# Patient Record
Sex: FEMALE | Race: WHITE | NOT HISPANIC OR LATINO | URBAN - METROPOLITAN AREA
[De-identification: names, ages, dates, MRNs, and addresses within clinical notes are randomized per-mention and may not be internally consistent; named-entity substitution may affect disease eponyms.]

---

## 2024-08-14 ENCOUNTER — OFFICE VISIT (OUTPATIENT)
Dept: PODIATRY | Facility: CLINIC | Age: 69
End: 2024-08-14
Payer: COMMERCIAL

## 2024-08-14 DIAGNOSIS — R26.89 ANTALGIC GAIT: ICD-10-CM

## 2024-08-14 DIAGNOSIS — M79.672 FOOT PAIN, BILATERAL: Primary | ICD-10-CM

## 2024-08-14 DIAGNOSIS — I87.8 CHRONIC VENOUS STASIS: ICD-10-CM

## 2024-08-14 DIAGNOSIS — M79.671 FOOT PAIN, BILATERAL: Primary | ICD-10-CM

## 2024-08-14 DIAGNOSIS — M72.2 PLANTAR FASCIITIS OF LEFT FOOT: ICD-10-CM

## 2024-08-14 DIAGNOSIS — E66.01 MORBID OBESITY (MULTI): ICD-10-CM

## 2024-08-14 DIAGNOSIS — B35.1 ONYCHOMYCOSIS: ICD-10-CM

## 2024-08-14 PROCEDURE — 1036F TOBACCO NON-USER: CPT | Performed by: PODIATRIST

## 2024-08-14 PROCEDURE — 20605 DRAIN/INJ JOINT/BURSA W/O US: CPT | Performed by: PODIATRIST

## 2024-08-14 PROCEDURE — 1160F RVW MEDS BY RX/DR IN RCRD: CPT | Performed by: PODIATRIST

## 2024-08-14 PROCEDURE — 1159F MED LIST DOCD IN RCRD: CPT | Performed by: PODIATRIST

## 2024-08-14 PROCEDURE — 99214 OFFICE O/P EST MOD 30 MIN: CPT | Performed by: PODIATRIST

## 2024-08-14 RX ORDER — DESIPRAMINE HYDROCHLORIDE 10 MG/1
1 TABLET ORAL DAILY
COMMUNITY
Start: 2019-05-08

## 2024-08-14 RX ORDER — MOMETASONE FUROATE 220 UG/1
INHALANT RESPIRATORY (INHALATION)
COMMUNITY

## 2024-08-14 RX ORDER — KETOCONAZOLE 20 MG/G
CREAM TOPICAL
COMMUNITY
Start: 2024-04-05

## 2024-08-14 RX ORDER — MOMETASONE FUROATE MONOHYDRATE 50 UG/1
2 SPRAY, METERED NASAL DAILY
COMMUNITY

## 2024-08-14 RX ORDER — POTASSIUM CHLORIDE 750 MG/1
20 TABLET, EXTENDED RELEASE ORAL DAILY
COMMUNITY

## 2024-08-14 RX ORDER — FUROSEMIDE 40 MG/1
40 TABLET ORAL 2 TIMES DAILY
COMMUNITY

## 2024-08-14 RX ORDER — DIAZEPAM 2 MG/1
2 TABLET ORAL NIGHTLY PRN
COMMUNITY
Start: 2024-05-01

## 2024-08-14 RX ORDER — NYSTATIN 100000 [USP'U]/G
1 POWDER TOPICAL 3 TIMES DAILY
COMMUNITY
Start: 2023-09-13

## 2024-08-14 RX ORDER — PREDNISONE 20 MG/1
TABLET ORAL
COMMUNITY

## 2024-08-14 RX ORDER — MONTELUKAST SODIUM 10 MG/1
10 TABLET ORAL DAILY
COMMUNITY

## 2024-08-14 RX ORDER — METHOCARBAMOL 500 MG/1
TABLET, FILM COATED ORAL
COMMUNITY
Start: 2024-02-13

## 2024-08-14 RX ORDER — LEVALBUTEROL INHALATION SOLUTION 0.63 MG/3ML
SOLUTION RESPIRATORY (INHALATION)
COMMUNITY

## 2024-08-14 RX ORDER — ALLOPURINOL 100 MG/1
TABLET ORAL
COMMUNITY

## 2024-08-14 RX ORDER — HYDROCORTISONE 25 MG/G
CREAM TOPICAL
COMMUNITY
Start: 2016-02-25

## 2024-08-14 RX ORDER — TRIAMCINOLONE ACETONIDE 40 MG/ML
40 INJECTION, SUSPENSION INTRA-ARTICULAR; INTRAMUSCULAR ONCE
Status: COMPLETED | OUTPATIENT
Start: 2024-08-14 | End: 2024-08-14

## 2024-08-14 RX ORDER — MUPIROCIN 20 MG/G
OINTMENT TOPICAL
COMMUNITY
Start: 2023-11-13

## 2024-08-14 RX ORDER — FLECAINIDE ACETATE 100 MG/1
100 TABLET ORAL EVERY 12 HOURS
COMMUNITY

## 2024-08-14 NOTE — PROGRESS NOTES
Chief Complaint   Patient presents with    Foot Pain     Patient is here today for left foot pain and a wound right leg      Last seen 4/23/2023    Pain with the left foot.  Points to the plantar bottom aspect of the foot.  Feels like there is a sock rolled up in the foot.  This is near the heel.  Duration 5 months or so. Also today in the office she noticed weeping from the right lower leg.  Has had blisters in the recent past.  Swelling of the extremities.  Not using compression at all.  She does have a supportive stocking for the left knee.  Has knee problems.  Using a cane for ambulation.  Also complains of very painful thickened nails.  Difficulty cutting.  End up cutting herself when she tried to cut the nails.         General/Constitutional: Alert. NAD.  Verbally obese.  Respiratory: Non labored breathing.   Psychiatric: Mood and affect normal/baseline.   HEENT: Sclera clear. Wearing corrective lenses.  Dermatologic: Nails are hypertrophic crumbly and yellow.  Nails are quite elongated.  Painful to palpation. No acute inflammatory infectious process. Web spaces are dry. No ulcers no pre-ulcerative areas except right lower leg there is several small blisters that are freely weeping serous fluid.  No signs of infection.  Vascular: Pedal pulses are intact and symmetric including the dorsalis pedis and the posterior tibial pulses.  Chronic edema of the extremities.  CVI.  Chronic lymphedema.    Neurological: Alert and oriented. No acute distress. No sensory impairment bilateral.  Musculoskeletal: Strength is normal for age. No acute deficits appreciated.  Bilateral pes planus deformity.  Pain on palpation plantar aspect left heel at medial calcaneal tuberosity.  No Achilles pain either extremity..     Impression: Chronic venous insufficiency.  Chronic lymphedema.  Bilateral foot pain.  Painful nail pathology.  Onychomycosis.  Plantar fasciitis.  Pes planus.  Morbid obesity       -Today's treatment and course of  therapy was discussed with the patient in detail. Patient's questions were answered. Proper foot care was discussed. This dictation was done using Dragon computer software and as such may contain grammatical errors.     -Nail debridement multiple nails 1 through 5 bilaterally.  Proper nail care discussed    -Order venous reflux study.    -Compression or lymphedema pumps would be helpful.    -Discussed proper shoe gear.  Weight loss would be helpful.  She understands this no knee surgery until weight loss.    -Any areas that are weeping should be covered with dry sterile dressing topical antibiotic.

## 2024-08-27 DIAGNOSIS — M79.671 PAIN IN BOTH FEET: Primary | ICD-10-CM

## 2024-08-27 DIAGNOSIS — M79.672 PAIN IN BOTH FEET: Primary | ICD-10-CM

## 2024-08-27 RX ORDER — TRAMADOL HYDROCHLORIDE 50 MG/1
50 TABLET ORAL EVERY 8 HOURS PRN
Qty: 10 TABLET | Refills: 1 | Status: SHIPPED | OUTPATIENT
Start: 2024-08-27 | End: 2024-09-06

## 2024-09-25 ENCOUNTER — OFFICE VISIT (OUTPATIENT)
Dept: PODIATRY | Facility: CLINIC | Age: 69
End: 2024-09-25
Payer: COMMERCIAL

## 2024-09-25 DIAGNOSIS — R26.89 ANTALGIC GAIT: ICD-10-CM

## 2024-09-25 DIAGNOSIS — M79.672 FOOT PAIN, BILATERAL: Primary | ICD-10-CM

## 2024-09-25 DIAGNOSIS — M79.671 FOOT PAIN, BILATERAL: Primary | ICD-10-CM

## 2024-09-25 DIAGNOSIS — B35.1 ONYCHOMYCOSIS: ICD-10-CM

## 2024-09-25 DIAGNOSIS — M72.2 PLANTAR FASCIITIS OF LEFT FOOT: ICD-10-CM

## 2024-09-25 DIAGNOSIS — I87.8 CHRONIC VENOUS STASIS: ICD-10-CM

## 2024-09-25 DIAGNOSIS — E66.01 MORBID OBESITY (MULTI): ICD-10-CM

## 2024-09-25 PROCEDURE — 99214 OFFICE O/P EST MOD 30 MIN: CPT | Performed by: PODIATRIST

## 2024-09-25 PROCEDURE — 1036F TOBACCO NON-USER: CPT | Performed by: PODIATRIST

## 2024-09-25 PROCEDURE — 1159F MED LIST DOCD IN RCRD: CPT | Performed by: PODIATRIST

## 2024-09-25 PROCEDURE — 1160F RVW MEDS BY RX/DR IN RCRD: CPT | Performed by: PODIATRIST

## 2024-09-25 RX ORDER — CICLOPIROX 80 MG/ML
SOLUTION TOPICAL
Qty: 6.6 ML | Refills: 1 | Status: SHIPPED | OUTPATIENT
Start: 2024-09-25

## 2024-09-25 NOTE — PROGRESS NOTES
Chief Complaint   Patient presents with    Foot Pain     Patient is here today for a follow up ELLIS foot pain        Has episodes of sciatica. Severe pain. Went to ER.  Physical therapy for this.  It has helped.   Wearing compression.   Has inserts from [employer].  Using a cane for ambulation.   Ongoing knee pain.   Also complains of very painful thickened nails.  Difficulty cutting.  End up cutting herself when she tried to cut the nails.  Has slowly lost some weight.   Will be seeing vascular doctor in the few weeks.  Has not had endovascular done.     General/Constitutional: Alert. NAD.  Morbid obese.  Respiratory: Non labored breathing.   Psychiatric: Mood and affect normal/baseline.   HEENT: Sclera clear. Wearing corrective lenses.  Dermatologic: Nails are hypertrophic crumbly and yellow.  Nails are quite elongated.  Painful to palpation. No acute inflammatory infectious process.   Blistering with the legs.  Slight maceration webspaces.    Vascular: Pedal pulses are intact and symmetric including the dorsalis pedis and the posterior tibial pulses.  Chronic edema of the extremities.  CVI.  Chronic lymphedema.  Swelling is slightly decreased in the legs.  Legs are not is indurated.  No evidence of cellulitis either extremity.  Neurological: Alert and oriented. No acute distress. No sensory impairment bilateral.  Musculoskeletal: Strength is normal for age. No acute deficits appreciated.  Bilateral pes planus deformity.  Today no palpable pain with the left foot.  No acute swelling.  No Tinel's.  No tissue induration noted.      Impression: Chronic venous insufficiency.  Chronic lymphedema.  Bilateral foot pain.  Painful nail pathology.  Onychomycosis.  Improved plantar fasciitis.  Pes planus.  Morbid obesity       -Today's treatment and course of therapy was discussed with the patient in detail. Patient's questions were answered. Proper foot care was discussed. This dictation was done using Dragon computer  software and as such may contain grammatical errors.     -Nail debridement multiple nails 1 through 5 bilaterally.  Proper nail care discussed.  Keep clean and dry between the digits.    -When you get the orthotics a slow break-in period encouraged.  Avoid walking barefoot.  Your shoes should probably be changed every 6 months or so especially if you start to feel discomfort and collapse of the arch of the shoe.    -Follow-up with endovascular.    -Rx Penlac.    -Continue with compression.  Elevate the legs as often you can.  Limit your salt intake.

## 2025-04-13 ENCOUNTER — HOSPITAL ENCOUNTER (OUTPATIENT)
Facility: HOSPITAL | Age: 70
Setting detail: OBSERVATION
End: 2025-04-13
Attending: STUDENT IN AN ORGANIZED HEALTH CARE EDUCATION/TRAINING PROGRAM | Admitting: STUDENT IN AN ORGANIZED HEALTH CARE EDUCATION/TRAINING PROGRAM
Payer: COMMERCIAL

## 2025-04-13 ENCOUNTER — APPOINTMENT (OUTPATIENT)
Dept: RADIOLOGY | Facility: HOSPITAL | Age: 70
End: 2025-04-13
Payer: COMMERCIAL

## 2025-04-13 VITALS
SYSTOLIC BLOOD PRESSURE: 144 MMHG | HEART RATE: 81 BPM | DIASTOLIC BLOOD PRESSURE: 72 MMHG | BODY MASS INDEX: 51.38 KG/M2 | HEIGHT: 63 IN | TEMPERATURE: 97.3 F | WEIGHT: 290 LBS | OXYGEN SATURATION: 95 % | RESPIRATION RATE: 16 BRPM

## 2025-04-13 DIAGNOSIS — R42 VERTIGO: ICD-10-CM

## 2025-04-13 DIAGNOSIS — E87.6 HYPOKALEMIA: ICD-10-CM

## 2025-04-13 DIAGNOSIS — I63.9 CEREBROVASCULAR ACCIDENT (CVA), UNSPECIFIED MECHANISM (MULTI): ICD-10-CM

## 2025-04-13 DIAGNOSIS — R42 DIZZINESS: Primary | ICD-10-CM

## 2025-04-13 LAB
ALBUMIN SERPL BCP-MCNC: 4 G/DL (ref 3.4–5)
ALP SERPL-CCNC: 72 U/L (ref 33–136)
ALT SERPL W P-5'-P-CCNC: 8 U/L (ref 7–45)
ANION GAP SERPL CALC-SCNC: 15 MMOL/L (ref 10–20)
AST SERPL W P-5'-P-CCNC: 15 U/L (ref 9–39)
BASOPHILS # BLD AUTO: 0.05 X10*3/UL (ref 0–0.1)
BASOPHILS NFR BLD AUTO: 0.7 %
BILIRUB SERPL-MCNC: 0.6 MG/DL (ref 0–1.2)
BNP SERPL-MCNC: 28 PG/ML (ref 0–99)
BUN SERPL-MCNC: 13 MG/DL (ref 6–23)
CALCIUM SERPL-MCNC: 8.5 MG/DL (ref 8.6–10.3)
CARDIAC TROPONIN I PNL SERPL HS: 3 NG/L (ref 0–13)
CARDIAC TROPONIN I PNL SERPL HS: 5 NG/L (ref 0–13)
CHLORIDE SERPL-SCNC: 105 MMOL/L (ref 98–107)
CO2 SERPL-SCNC: 25 MMOL/L (ref 21–32)
CREAT SERPL-MCNC: 0.9 MG/DL (ref 0.5–1.05)
EGFRCR SERPLBLD CKD-EPI 2021: 70 ML/MIN/1.73M*2
EOSINOPHIL # BLD AUTO: 0.32 X10*3/UL (ref 0–0.7)
EOSINOPHIL NFR BLD AUTO: 4.5 %
ERYTHROCYTE [DISTWIDTH] IN BLOOD BY AUTOMATED COUNT: 14.6 % (ref 11.5–14.5)
FLUAV RNA RESP QL NAA+PROBE: NOT DETECTED
FLUBV RNA RESP QL NAA+PROBE: NOT DETECTED
GLUCOSE SERPL-MCNC: 123 MG/DL (ref 74–99)
HCT VFR BLD AUTO: 42.1 % (ref 36–46)
HGB BLD-MCNC: 13 G/DL (ref 12–16)
IMM GRANULOCYTES # BLD AUTO: 0.03 X10*3/UL (ref 0–0.7)
IMM GRANULOCYTES NFR BLD AUTO: 0.4 % (ref 0–0.9)
LYMPHOCYTES # BLD AUTO: 1.52 X10*3/UL (ref 1.2–4.8)
LYMPHOCYTES NFR BLD AUTO: 21.5 %
MAGNESIUM SERPL-MCNC: 1.96 MG/DL (ref 1.6–2.4)
MCH RBC QN AUTO: 27.3 PG (ref 26–34)
MCHC RBC AUTO-ENTMCNC: 30.9 G/DL (ref 32–36)
MCV RBC AUTO: 88 FL (ref 80–100)
MONOCYTES # BLD AUTO: 0.51 X10*3/UL (ref 0.1–1)
MONOCYTES NFR BLD AUTO: 7.2 %
NEUTROPHILS # BLD AUTO: 4.64 X10*3/UL (ref 1.2–7.7)
NEUTROPHILS NFR BLD AUTO: 65.7 %
NRBC BLD-RTO: 0 /100 WBCS (ref 0–0)
PLATELET # BLD AUTO: 307 X10*3/UL (ref 150–450)
POTASSIUM SERPL-SCNC: 3.6 MMOL/L (ref 3.5–5.3)
PROT SERPL-MCNC: 6.8 G/DL (ref 6.4–8.2)
RBC # BLD AUTO: 4.77 X10*6/UL (ref 4–5.2)
RSV RNA RESP QL NAA+PROBE: NOT DETECTED
SARS-COV-2 RNA RESP QL NAA+PROBE: NOT DETECTED
SODIUM SERPL-SCNC: 141 MMOL/L (ref 136–145)
WBC # BLD AUTO: 7.1 X10*3/UL (ref 4.4–11.3)

## 2025-04-13 PROCEDURE — 36415 COLL VENOUS BLD VENIPUNCTURE: CPT | Performed by: STUDENT IN AN ORGANIZED HEALTH CARE EDUCATION/TRAINING PROGRAM

## 2025-04-13 PROCEDURE — 96372 THER/PROPH/DIAG INJ SC/IM: CPT

## 2025-04-13 PROCEDURE — 87637 SARSCOV2&INF A&B&RSV AMP PRB: CPT | Performed by: STUDENT IN AN ORGANIZED HEALTH CARE EDUCATION/TRAINING PROGRAM

## 2025-04-13 PROCEDURE — 94640 AIRWAY INHALATION TREATMENT: CPT

## 2025-04-13 PROCEDURE — 2500000002 HC RX 250 W HCPCS SELF ADMINISTERED DRUGS (ALT 637 FOR MEDICARE OP, ALT 636 FOR OP/ED): Performed by: STUDENT IN AN ORGANIZED HEALTH CARE EDUCATION/TRAINING PROGRAM

## 2025-04-13 PROCEDURE — 2500000001 HC RX 250 WO HCPCS SELF ADMINISTERED DRUGS (ALT 637 FOR MEDICARE OP)

## 2025-04-13 PROCEDURE — 83735 ASSAY OF MAGNESIUM: CPT | Performed by: STUDENT IN AN ORGANIZED HEALTH CARE EDUCATION/TRAINING PROGRAM

## 2025-04-13 PROCEDURE — 2500000004 HC RX 250 GENERAL PHARMACY W/ HCPCS (ALT 636 FOR OP/ED)

## 2025-04-13 PROCEDURE — 80053 COMPREHEN METABOLIC PANEL: CPT | Performed by: STUDENT IN AN ORGANIZED HEALTH CARE EDUCATION/TRAINING PROGRAM

## 2025-04-13 PROCEDURE — 2500000004 HC RX 250 GENERAL PHARMACY W/ HCPCS (ALT 636 FOR OP/ED): Performed by: STUDENT IN AN ORGANIZED HEALTH CARE EDUCATION/TRAINING PROGRAM

## 2025-04-13 PROCEDURE — 70496 CT ANGIOGRAPHY HEAD: CPT

## 2025-04-13 PROCEDURE — 99223 1ST HOSP IP/OBS HIGH 75: CPT | Performed by: STUDENT IN AN ORGANIZED HEALTH CARE EDUCATION/TRAINING PROGRAM

## 2025-04-13 PROCEDURE — 71046 X-RAY EXAM CHEST 2 VIEWS: CPT | Mod: FOREIGN READ | Performed by: RADIOLOGY

## 2025-04-13 PROCEDURE — 83880 ASSAY OF NATRIURETIC PEPTIDE: CPT | Performed by: STUDENT IN AN ORGANIZED HEALTH CARE EDUCATION/TRAINING PROGRAM

## 2025-04-13 PROCEDURE — 70450 CT HEAD/BRAIN W/O DYE: CPT | Performed by: RADIOLOGY

## 2025-04-13 PROCEDURE — 85025 COMPLETE CBC W/AUTO DIFF WBC: CPT | Performed by: STUDENT IN AN ORGANIZED HEALTH CARE EDUCATION/TRAINING PROGRAM

## 2025-04-13 PROCEDURE — 71046 X-RAY EXAM CHEST 2 VIEWS: CPT

## 2025-04-13 PROCEDURE — 2550000001 HC RX 255 CONTRASTS: Performed by: STUDENT IN AN ORGANIZED HEALTH CARE EDUCATION/TRAINING PROGRAM

## 2025-04-13 PROCEDURE — 70496 CT ANGIOGRAPHY HEAD: CPT | Performed by: RADIOLOGY

## 2025-04-13 PROCEDURE — 70450 CT HEAD/BRAIN W/O DYE: CPT

## 2025-04-13 PROCEDURE — G0378 HOSPITAL OBSERVATION PER HR: HCPCS

## 2025-04-13 PROCEDURE — 84484 ASSAY OF TROPONIN QUANT: CPT | Performed by: STUDENT IN AN ORGANIZED HEALTH CARE EDUCATION/TRAINING PROGRAM

## 2025-04-13 PROCEDURE — 99285 EMERGENCY DEPT VISIT HI MDM: CPT | Mod: 25 | Performed by: STUDENT IN AN ORGANIZED HEALTH CARE EDUCATION/TRAINING PROGRAM

## 2025-04-13 RX ORDER — IPRATROPIUM BROMIDE AND ALBUTEROL SULFATE 2.5; .5 MG/3ML; MG/3ML
3 SOLUTION RESPIRATORY (INHALATION) ONCE
Status: COMPLETED | OUTPATIENT
Start: 2025-04-13 | End: 2025-04-13

## 2025-04-13 RX ORDER — ACETAMINOPHEN 500 MG
500 TABLET ORAL NIGHTLY
COMMUNITY

## 2025-04-13 RX ORDER — ONDANSETRON HYDROCHLORIDE 2 MG/ML
4 INJECTION, SOLUTION INTRAVENOUS EVERY 6 HOURS PRN
Status: DISCONTINUED | OUTPATIENT
Start: 2025-04-13 | End: 2025-04-15 | Stop reason: HOSPADM

## 2025-04-13 RX ORDER — LEVALBUTEROL TARTRATE 45 UG/1
2 AEROSOL, METERED ORAL EVERY 4 HOURS PRN
COMMUNITY
Start: 2025-03-10

## 2025-04-13 RX ORDER — ACETAMINOPHEN 325 MG/1
500 TABLET ORAL NIGHTLY
Status: DISCONTINUED | OUTPATIENT
Start: 2025-04-13 | End: 2025-04-13

## 2025-04-13 RX ORDER — FUROSEMIDE 40 MG/1
40 TABLET ORAL DAILY
Status: DISCONTINUED | OUTPATIENT
Start: 2025-04-13 | End: 2025-04-15 | Stop reason: HOSPADM

## 2025-04-13 RX ORDER — ALBUTEROL SULFATE 0.83 MG/ML
1.25 SOLUTION RESPIRATORY (INHALATION) EVERY 4 HOURS PRN
Status: DISCONTINUED | OUTPATIENT
Start: 2025-04-13 | End: 2025-04-15

## 2025-04-13 RX ORDER — ATORVASTATIN CALCIUM 40 MG/1
40 TABLET, FILM COATED ORAL NIGHTLY
Status: DISCONTINUED | OUTPATIENT
Start: 2025-04-13 | End: 2025-04-14

## 2025-04-13 RX ORDER — SODIUM CHLORIDE, SODIUM LACTATE, POTASSIUM CHLORIDE, CALCIUM CHLORIDE 600; 310; 30; 20 MG/100ML; MG/100ML; MG/100ML; MG/100ML
100 INJECTION, SOLUTION INTRAVENOUS CONTINUOUS
Status: ACTIVE | OUTPATIENT
Start: 2025-04-13 | End: 2025-04-14

## 2025-04-13 RX ORDER — LABETALOL HYDROCHLORIDE 5 MG/ML
10 INJECTION, SOLUTION INTRAVENOUS EVERY 10 MIN PRN
Status: DISCONTINUED | OUTPATIENT
Start: 2025-04-13 | End: 2025-04-13

## 2025-04-13 RX ORDER — ACETAMINOPHEN 650 MG/1
650 SUPPOSITORY RECTAL EVERY 4 HOURS PRN
Status: DISCONTINUED | OUTPATIENT
Start: 2025-04-13 | End: 2025-04-15 | Stop reason: HOSPADM

## 2025-04-13 RX ORDER — METHOCARBAMOL 500 MG/1
500 TABLET, FILM COATED ORAL 3 TIMES DAILY PRN
Status: DISCONTINUED | OUTPATIENT
Start: 2025-04-13 | End: 2025-04-15 | Stop reason: HOSPADM

## 2025-04-13 RX ORDER — LEVALBUTEROL INHALATION SOLUTION 1.25 MG/3ML
1 SOLUTION RESPIRATORY (INHALATION) EVERY 4 HOURS PRN
COMMUNITY
Start: 2025-03-10

## 2025-04-13 RX ORDER — MECLIZINE HYDROCHLORIDE 25 MG/1
25 TABLET ORAL 3 TIMES DAILY PRN
Status: DISCONTINUED | OUTPATIENT
Start: 2025-04-13 | End: 2025-04-15 | Stop reason: HOSPADM

## 2025-04-13 RX ORDER — ASPIRIN 81 MG/1
81 TABLET ORAL DAILY
Status: DISCONTINUED | OUTPATIENT
Start: 2025-04-13 | End: 2025-04-15 | Stop reason: HOSPADM

## 2025-04-13 RX ORDER — BISMUTH SUBSALICYLATE 525 MG/30ML
15 LIQUID ORAL EVERY 6 HOURS PRN
COMMUNITY

## 2025-04-13 RX ORDER — FLECAINIDE ACETATE 100 MG/1
100 TABLET ORAL EVERY 12 HOURS
Status: DISCONTINUED | OUTPATIENT
Start: 2025-04-13 | End: 2025-04-15 | Stop reason: HOSPADM

## 2025-04-13 RX ORDER — BISMUTH SUBSALICYLATE 525 MG/30ML
262 LIQUID ORAL EVERY 6 HOURS PRN
Status: DISCONTINUED | OUTPATIENT
Start: 2025-04-13 | End: 2025-04-15 | Stop reason: HOSPADM

## 2025-04-13 RX ORDER — ACETAMINOPHEN 325 MG/1
650 TABLET ORAL EVERY 4 HOURS PRN
Status: DISCONTINUED | OUTPATIENT
Start: 2025-04-13 | End: 2025-04-15 | Stop reason: HOSPADM

## 2025-04-13 RX ORDER — ASCORBIC ACID 125 MG
1 TABLET,CHEWABLE ORAL DAILY
COMMUNITY

## 2025-04-13 RX ORDER — ACETAMINOPHEN 160 MG/5ML
650 SOLUTION ORAL EVERY 4 HOURS PRN
Status: DISCONTINUED | OUTPATIENT
Start: 2025-04-13 | End: 2025-04-15 | Stop reason: HOSPADM

## 2025-04-13 RX ORDER — MONTELUKAST SODIUM 10 MG/1
10 TABLET ORAL DAILY
Status: DISCONTINUED | OUTPATIENT
Start: 2025-04-13 | End: 2025-04-15 | Stop reason: HOSPADM

## 2025-04-13 RX ORDER — HYDROCORTISONE 25 MG/G
1 CREAM TOPICAL 2 TIMES DAILY PRN
Status: DISCONTINUED | OUTPATIENT
Start: 2025-04-13 | End: 2025-04-15 | Stop reason: HOSPADM

## 2025-04-13 RX ORDER — PREDNISONE 20 MG/1
40 TABLET ORAL DAILY
Status: DISCONTINUED | OUTPATIENT
Start: 2025-04-13 | End: 2025-04-13

## 2025-04-13 RX ORDER — ENOXAPARIN SODIUM 100 MG/ML
60 INJECTION SUBCUTANEOUS EVERY 12 HOURS SCHEDULED
Status: DISCONTINUED | OUTPATIENT
Start: 2025-04-13 | End: 2025-04-15 | Stop reason: HOSPADM

## 2025-04-13 RX ORDER — BISMUTH SUBSALICYLATE 525 MG/30ML
262 LIQUID ORAL EVERY 6 HOURS PRN
Status: DISCONTINUED | OUTPATIENT
Start: 2025-04-13 | End: 2025-04-13

## 2025-04-13 RX ADMIN — FLECAINIDE ACETATE 100 MG: 100 TABLET ORAL at 22:08

## 2025-04-13 RX ADMIN — ATORVASTATIN CALCIUM 40 MG: 40 TABLET, FILM COATED ORAL at 20:34

## 2025-04-13 RX ADMIN — SODIUM CHLORIDE, POTASSIUM CHLORIDE, SODIUM LACTATE AND CALCIUM CHLORIDE 100 ML/HR: 600; 310; 30; 20 INJECTION, SOLUTION INTRAVENOUS at 17:46

## 2025-04-13 RX ADMIN — IOHEXOL 75 ML: 350 INJECTION, SOLUTION INTRAVENOUS at 10:37

## 2025-04-13 RX ADMIN — BISMUTH SUBSALICYLATE 262 MG: 525 LIQUID ORAL at 22:08

## 2025-04-13 RX ADMIN — IPRATROPIUM BROMIDE AND ALBUTEROL SULFATE 3 ML: .5; 3 SOLUTION RESPIRATORY (INHALATION) at 08:13

## 2025-04-13 SDOH — SOCIAL STABILITY: SOCIAL INSECURITY: WITHIN THE LAST YEAR, HAVE YOU BEEN HUMILIATED OR EMOTIONALLY ABUSED IN OTHER WAYS BY YOUR PARTNER OR EX-PARTNER?: NO

## 2025-04-13 SDOH — SOCIAL STABILITY: SOCIAL INSECURITY: WITHIN THE LAST YEAR, HAVE YOU BEEN AFRAID OF YOUR PARTNER OR EX-PARTNER?: NO

## 2025-04-13 SDOH — HEALTH STABILITY: MENTAL HEALTH: HOW OFTEN DO YOU HAVE SIX OR MORE DRINKS ON ONE OCCASION?: NEVER

## 2025-04-13 SDOH — SOCIAL STABILITY: SOCIAL INSECURITY: ARE YOU OR HAVE YOU BEEN THREATENED OR ABUSED PHYSICALLY, EMOTIONALLY, OR SEXUALLY BY ANYONE?: NO

## 2025-04-13 SDOH — ECONOMIC STABILITY: INCOME INSECURITY: IN THE PAST 12 MONTHS HAS THE ELECTRIC, GAS, OIL, OR WATER COMPANY THREATENED TO SHUT OFF SERVICES IN YOUR HOME?: NO

## 2025-04-13 SDOH — SOCIAL STABILITY: SOCIAL INSECURITY: HAS ANYONE EVER THREATENED TO HURT YOUR FAMILY OR YOUR PETS?: NO

## 2025-04-13 SDOH — SOCIAL STABILITY: SOCIAL INSECURITY: DO YOU FEEL ANYONE HAS EXPLOITED OR TAKEN ADVANTAGE OF YOU FINANCIALLY OR OF YOUR PERSONAL PROPERTY?: NO

## 2025-04-13 SDOH — HEALTH STABILITY: MENTAL HEALTH: HOW MANY DRINKS CONTAINING ALCOHOL DO YOU HAVE ON A TYPICAL DAY WHEN YOU ARE DRINKING?: PATIENT DOES NOT DRINK

## 2025-04-13 SDOH — ECONOMIC STABILITY: FOOD INSECURITY: HOW HARD IS IT FOR YOU TO PAY FOR THE VERY BASICS LIKE FOOD, HOUSING, MEDICAL CARE, AND HEATING?: NOT VERY HARD

## 2025-04-13 SDOH — ECONOMIC STABILITY: HOUSING INSECURITY: IN THE LAST 12 MONTHS, WAS THERE A TIME WHEN YOU WERE NOT ABLE TO PAY THE MORTGAGE OR RENT ON TIME?: NO

## 2025-04-13 SDOH — SOCIAL STABILITY: SOCIAL INSECURITY: ABUSE: ADULT

## 2025-04-13 SDOH — SOCIAL STABILITY: SOCIAL INSECURITY
WITHIN THE LAST YEAR, HAVE YOU BEEN RAPED OR FORCED TO HAVE ANY KIND OF SEXUAL ACTIVITY BY YOUR PARTNER OR EX-PARTNER?: NO

## 2025-04-13 SDOH — ECONOMIC STABILITY: HOUSING INSECURITY: AT ANY TIME IN THE PAST 12 MONTHS, WERE YOU HOMELESS OR LIVING IN A SHELTER (INCLUDING NOW)?: NO

## 2025-04-13 SDOH — SOCIAL STABILITY: SOCIAL INSECURITY: DO YOU FEEL UNSAFE GOING BACK TO THE PLACE WHERE YOU ARE LIVING?: NO

## 2025-04-13 SDOH — HEALTH STABILITY: MENTAL HEALTH: HOW OFTEN DO YOU HAVE A DRINK CONTAINING ALCOHOL?: NEVER

## 2025-04-13 SDOH — SOCIAL STABILITY: SOCIAL INSECURITY
WITHIN THE LAST YEAR, HAVE YOU BEEN KICKED, HIT, SLAPPED, OR OTHERWISE PHYSICALLY HURT BY YOUR PARTNER OR EX-PARTNER?: NO

## 2025-04-13 SDOH — ECONOMIC STABILITY: TRANSPORTATION INSECURITY: IN THE PAST 12 MONTHS, HAS LACK OF TRANSPORTATION KEPT YOU FROM MEDICAL APPOINTMENTS OR FROM GETTING MEDICATIONS?: NO

## 2025-04-13 SDOH — SOCIAL STABILITY: SOCIAL INSECURITY: HAVE YOU HAD THOUGHTS OF HARMING ANYONE ELSE?: NO

## 2025-04-13 SDOH — ECONOMIC STABILITY: FOOD INSECURITY: WITHIN THE PAST 12 MONTHS, THE FOOD YOU BOUGHT JUST DIDN'T LAST AND YOU DIDN'T HAVE MONEY TO GET MORE.: NEVER TRUE

## 2025-04-13 SDOH — SOCIAL STABILITY: SOCIAL INSECURITY: ARE THERE ANY APPARENT SIGNS OF INJURIES/BEHAVIORS THAT COULD BE RELATED TO ABUSE/NEGLECT?: NO

## 2025-04-13 SDOH — ECONOMIC STABILITY: FOOD INSECURITY: WITHIN THE PAST 12 MONTHS, YOU WORRIED THAT YOUR FOOD WOULD RUN OUT BEFORE YOU GOT THE MONEY TO BUY MORE.: NEVER TRUE

## 2025-04-13 SDOH — SOCIAL STABILITY: SOCIAL INSECURITY: HAVE YOU HAD ANY THOUGHTS OF HARMING ANYONE ELSE?: NO

## 2025-04-13 SDOH — ECONOMIC STABILITY: HOUSING INSECURITY: IN THE PAST 12 MONTHS, HOW MANY TIMES HAVE YOU MOVED WHERE YOU WERE LIVING?: 0

## 2025-04-13 SDOH — SOCIAL STABILITY: SOCIAL INSECURITY: WERE YOU ABLE TO COMPLETE ALL THE BEHAVIORAL HEALTH SCREENINGS?: YES

## 2025-04-13 SDOH — SOCIAL STABILITY: SOCIAL INSECURITY: DOES ANYONE TRY TO KEEP YOU FROM HAVING/CONTACTING OTHER FRIENDS OR DOING THINGS OUTSIDE YOUR HOME?: NO

## 2025-04-13 ASSESSMENT — LIFESTYLE VARIABLES
AUDIT-C TOTAL SCORE: 0
HOW OFTEN DO YOU HAVE A DRINK CONTAINING ALCOHOL: NEVER
SKIP TO QUESTIONS 9-10: 1
HOW MANY STANDARD DRINKS CONTAINING ALCOHOL DO YOU HAVE ON A TYPICAL DAY: PATIENT DOES NOT DRINK
HOW OFTEN DO YOU HAVE 6 OR MORE DRINKS ON ONE OCCASION: NEVER
HOW OFTEN DO YOU HAVE 6 OR MORE DRINKS ON ONE OCCASION: NEVER
HOW MANY STANDARD DRINKS CONTAINING ALCOHOL DO YOU HAVE ON A TYPICAL DAY: PATIENT DOES NOT DRINK
HOW OFTEN DO YOU HAVE A DRINK CONTAINING ALCOHOL: NEVER
SKIP TO QUESTIONS 9-10: 1
AUDIT-C TOTAL SCORE: 0
SKIP TO QUESTIONS 9-10: 1
AUDIT-C TOTAL SCORE: 0

## 2025-04-13 ASSESSMENT — COLUMBIA-SUICIDE SEVERITY RATING SCALE - C-SSRS
1. IN THE PAST MONTH, HAVE YOU WISHED YOU WERE DEAD OR WISHED YOU COULD GO TO SLEEP AND NOT WAKE UP?: NO
2. HAVE YOU ACTUALLY HAD ANY THOUGHTS OF KILLING YOURSELF?: NO
6. HAVE YOU EVER DONE ANYTHING, STARTED TO DO ANYTHING, OR PREPARED TO DO ANYTHING TO END YOUR LIFE?: NO

## 2025-04-13 ASSESSMENT — ACTIVITIES OF DAILY LIVING (ADL)
LACK_OF_TRANSPORTATION: NO
JUDGMENT_ADEQUATE_SAFELY_COMPLETE_DAILY_ACTIVITIES: YES
ADEQUATE_TO_COMPLETE_ADL: YES
LACK_OF_TRANSPORTATION: NO
GROOMING: INDEPENDENT
BATHING: INDEPENDENT
DRESSING YOURSELF: INDEPENDENT
WALKS IN HOME: INDEPENDENT
PATIENT'S MEMORY ADEQUATE TO SAFELY COMPLETE DAILY ACTIVITIES?: YES
TOILETING: INDEPENDENT
FEEDING YOURSELF: INDEPENDENT
HEARING - RIGHT EAR: FUNCTIONAL
HEARING - LEFT EAR: FUNCTIONAL

## 2025-04-13 ASSESSMENT — ENCOUNTER SYMPTOMS
ENDOCRINE NEGATIVE: 1
VOICE CHANGE: 0
HEMATOLOGIC/LYMPHATIC NEGATIVE: 1
CARDIOVASCULAR NEGATIVE: 1
EYES NEGATIVE: 1
PSYCHIATRIC NEGATIVE: 1
SORE THROAT: 0
WEAKNESS: 0
COUGH: 1
TREMORS: 0
TROUBLE SWALLOWING: 0
DIZZINESS: 1
ALLERGIC/IMMUNOLOGIC NEGATIVE: 1
FATIGUE: 1
NUMBNESS: 0
SPEECH DIFFICULTY: 0
MUSCULOSKELETAL NEGATIVE: 1
GASTROINTESTINAL NEGATIVE: 1

## 2025-04-13 ASSESSMENT — PATIENT HEALTH QUESTIONNAIRE - PHQ9
SUM OF ALL RESPONSES TO PHQ9 QUESTIONS 1 & 2: 0
SUM OF ALL RESPONSES TO PHQ9 QUESTIONS 1 & 2: 0
2. FEELING DOWN, DEPRESSED OR HOPELESS: NOT AT ALL
1. LITTLE INTEREST OR PLEASURE IN DOING THINGS: NOT AT ALL
1. LITTLE INTEREST OR PLEASURE IN DOING THINGS: NOT AT ALL
2. FEELING DOWN, DEPRESSED OR HOPELESS: NOT AT ALL

## 2025-04-13 ASSESSMENT — COGNITIVE AND FUNCTIONAL STATUS - GENERAL
CLIMB 3 TO 5 STEPS WITH RAILING: A LITTLE
WALKING IN HOSPITAL ROOM: A LITTLE
DAILY ACTIVITIY SCORE: 24
MOBILITY SCORE: 22
CLIMB 3 TO 5 STEPS WITH RAILING: A LITTLE
PATIENT BASELINE BEDBOUND: NO
WALKING IN HOSPITAL ROOM: A LITTLE
MOBILITY SCORE: 22

## 2025-04-13 ASSESSMENT — PAIN SCALES - GENERAL: PAINLEVEL_OUTOF10: 0 - NO PAIN

## 2025-04-13 NOTE — ED TRIAGE NOTES
Pt presents to department from home via EMS with c/o dizziness. Pt states she woke up to go to the restroom and when she stood up the started the room started spinning and started to have double vision. Pt states symptoms have resolved upon arrival. Pt reports hx of afib with ablation and vertigo. VSS

## 2025-04-13 NOTE — H&P
History Of Present Illness  Natasha Hargrove is a 70 y.o. female with past medical history of atrial fibrillation status post ablation, chronic allergic rhinitis, degenerative joint disease, GERD, intertrigo, obesity, rosacea, hypovitaminosis D, hernia repair, hysterectomy, multiple hernia repairs presenting with chief complaint of dizziness.  Earlier today the patient had dizziness, she was sitting on the toilet and moved her head and felt as though the room began spinning around her and she had double vision at this time.  At the time of my assessment in the ED her double vision is totally resolved.  She does endorse dry cough which she has had for the past month or so, she recently completed 10-day course of Augmentin and was seen in the emergency department at [facility] on  for feeling rundown where she was tested for flu and coronavirus and these were negative and she was subsequently sent home.  Currently she is resting in the ED hemodynamically stable.  Her chest x-ray does demonstrate some vascular congestion.  CT imaging of her head and neck vessels was unremarkable, there is no acute process going on in her brain at this time.  Labs are mostly unremarkable as well, EKG demonstrated sinus rhythm in the ED.  Patient will be admitted for further workup.     Past Medical History  Past Medical History:   Diagnosis Date    Personal history of other diseases of the respiratory system 2015    History of asthma    Unilateral primary osteoarthritis, left knee 2015    Degenerative arthritis of left knee       Surgical History  Past Surgical History:   Procedure Laterality Date    AV NODE ABLATION  2016    Catheter Ablation Atrial Fibrillation     SECTION, CLASSIC  2015     Section    HERNIA REPAIR  2015    Hernia Repair Inguinal Bilateral    OTHER SURGICAL HISTORY  2015    Hysterotomy (Obstetrical)    OTHER SURGICAL HISTORY  2015    Foot Surgery Left  "       Social History  Lives at home with , has been under a lot of stress with her job as a   Family History  History of TIA in her sister, history of stroke in her mother     Allergies  Grass pollen    Review of Systems   Constitutional:  Positive for fatigue.   HENT:  Positive for congestion. Negative for ear discharge, ear pain, sore throat, trouble swallowing and voice change.    Eyes: Negative.    Respiratory:  Positive for cough.    Cardiovascular: Negative.    Gastrointestinal: Negative.    Endocrine: Negative.    Genitourinary: Negative.    Musculoskeletal: Negative.    Allergic/Immunologic: Negative.    Neurological:  Positive for dizziness. Negative for tremors, syncope, speech difficulty, weakness and numbness.   Hematological: Negative.    Psychiatric/Behavioral: Negative.          Physical Exam  Constitutional:       Appearance: She is obese. She is not ill-appearing.   HENT:      Head: Normocephalic and atraumatic.   Eyes:      Extraocular Movements: Extraocular movements intact.      Pupils: Pupils are equal, round, and reactive to light.   Cardiovascular:      Rate and Rhythm: Normal rate and regular rhythm.   Abdominal:      General: Bowel sounds are normal.      Tenderness: There is no guarding.   Musculoskeletal:      Right lower leg: Edema present.      Left lower leg: Edema present.   Skin:     General: Skin is warm and dry.   Neurological:      Cranial Nerves: No cranial nerve deficit.      Motor: No weakness.   Psychiatric:         Mood and Affect: Mood normal.         Behavior: Behavior normal.          Last Recorded Vitals  Blood pressure 122/60, pulse 91, temperature 37.2 °C (99 °F), temperature source Temporal, resp. rate (!) 46, height 1.6 m (5' 3\"), weight 132 kg (290 lb), SpO2 95%.    Relevant Results  CT angio head w and wo IV contrast    Result Date: 4/13/2025  Interpreted By:  Mary Olsen, STUDY: CT ANGIO HEAD W AND WO IV CONTRAST;  4/13/2025 10:34 am   " INDICATION: Signs/Symptoms:acute onset vertigo and double vision, now resolved but with nystagmus.   COMPARISON: Same day unenhanced head CT which is reported separately.   ACCESSION NUMBER(S): [id number]   ORDERING CLINICIAN: KEYSHAWN SNYDER   TECHNIQUE: 75 mL Omnipaque 350 was administered intravenously and axial images of the head were acquired.  Coronal, sagittal, and 3-D reconstructions were provided for review. 3D reconstructions were performed on an independent workstation but were not provided on PACS until approximately 12:11 p.m..   FINDINGS:     Anterior circulation: The bilateral intracranial internal carotid arteries, bilateral carotid terminals, bilateral proximal anterior and middle cerebral arteries are patent.   Posterior circulation: Bilateral intracranial vertebral arteries, vertebrobasilar junction, basilar artery and proximal posterior cerebral arteries are patent.         No evidence for significant stenosis or large branch vessel cutoffs of the intracranial vessels. MRI with diffusion-imaging would be a more sensitive means of assessing for acute ischemic injury, particularly of the posterior fossa.     MACRO: None   Signed by: Mary Olsen 4/13/2025 12:11 PM Dictation workstation:   [id number]    CT head wo IV contrast    Result Date: 4/13/2025  Interpreted By:  Avani Mason, STUDY: CT HEAD WO IV CONTRAST;  4/13/2025 10:34 am   INDICATION: Signs/Symptoms:vertigo.   COMPARISON: None.   ACCESSION NUMBER(S): [id number]   ORDERING CLINICIAN: KEYSHAWN SNYDER   TECHNIQUE: CT axial images through the Brain were obtained without contrast.   All CT exams are performed with 1 or more of the following dose reduction techniques: Automatic exposure control, adjustment of mA and/or kv according to patient size, or use of iterative reconstruction techniques.   FINDINGS: There is no mass effect, hemorrhage, or infarct. The ventricles appear normal. Gray-white differentiation is maintained.   Mild  paranasal sinus mucosal thickening noted.. The visualized portions of the orbits are unremarkable.       No acute intracranial abnormality.   MACRO: None.   Signed by: Avani Mason 4/13/2025 11:04 AM Dictation workstation:   [id number]    XR chest 2 views    Result Date: 4/13/2025  STUDY: Chest Radiographs;  4/13/2025 8:01 AM INDICATION: Cough. COMPARISON: None Available. ACCESSION NUMBER(S): [id number] ORDERING CLINICIAN: KEYSHAWN SNYDER TECHNIQUE:  Frontal and lateral chest. FINDINGS: CARDIOMEDIASTINAL SILHOUETTE: Cardiomediastinal silhouette is enlarged. Increased central pulmonary vascularity with cephalization. LUNGS: Left lung base and CP angle are obscured by cardiac silhouette. Left-sided small pleural effusion cannot be excluded. No large pulmonary consolidation, pleural effusion or pneumothorax within the visualized lungs. ABDOMEN: No remarkable upper abdominal findings.  BONES: No acute osseous changes.    Cardiomegaly with increased central pulmonary vascularity with cephalization. Left lung base and CP angle are obscured by cardiac silhouette. Left-sided small pleural effusion cannot be excluded. No large pulmonary consolidation, pleural effusion or pneumothorax within the visualized lungs. Findings likely due to pulmonary congestion. Signed by Bran Guzman MD    XR chest 1 view    Result Date: 4/1/2025  * * *Final Report* * * DATE OF EXAM: Apr 1 2025  9:36PM   MMX   [id number]  -  XR CHEST 1V FRONTAL PORT  / ACCESSION #  [id number] PROCEDURE REASON: Fatigue and malaise      * * * * Physician Interpretation * * * *  EXAMINATION:  CHEST RADIOGRAPH (PORTABLE SINGLE VIEW AP) Exam Date/Time:  4/1/2025 9:36 PM CLINICAL HISTORY: Fatigue and malaise, Fatigue and malaise MQ:  XCPR_5 Comparison:  Chest clear with 13 2023 RESULT: Lines, tubes, and devices:  None. Lungs and pleura:  No consolidation.  No pleural effusion or pneumothorax. Cardiomediastinal silhouette:  Stable cardiomediastinal silhouette. Other:  No  acute bony abnormality.    IMPRESSION: No acute radiographic abnormality. [facility]   Transcribe Date/Time: Apr 1 2025 11:04P Dictated by : MORGAN TOBIAS MD This examination was interpreted and the report reviewed and electronically signed by: MORGAN TOBIAS MD on Apr 1 2025 11:07PM  EST    XR lumbar spine 2-3 views    Result Date: 3/28/2025  * * *Final Report* * * DATE OF EXAM: Mar 28 2025 11:12AM   CCX   [id number]  -  XR LUMBAR 3V AP/LAT/L5-S1  / ACCESSION #  [id number] PROCEDURE REASON: Sciatica of right side      * * * * Physician Interpretation * * * *  HISTORY:  Sciatica of right side TECHNOLOGIST PROVIDED HISTORY (if applicable): PT sts butt cheek pain on right side. No prev sx or injury TECHNIQUE: XR LUMBAR 3V AP/LAT/L5-S1 RESULT: Lumbar spine 3 views.   Counting reference:  Lumbosacral junction.  For the purposes of this report, L5-S1 is considered the most caudal well formed disc space. Detail limited by habitus and suspected osteopenia. Levoscoliosis is centered at L3.  The lordosis is preserved.  Grade 1 degenerative anterolisthesis at L4-5 with likely greater retrolisthesis at L3-4.  Disc space narrowing and endplate sclerosis at L5-S1.  Facet arthropathy L4 caudal. Mild degenerative sclerosis at the RIGHT sacroiliac joint.  The LEFT SI joint and hips are grossly preserved.  The symphysis is not diagnostically visualized. 2 cm calcified Gallstone.  Vascular calcifications.    IMPRESSION: MILD LEVOSCOLIOSIS AND DEGENERATIVE CHANGES. : [facility]   Transcribe Date/Time: Mar 28 2025  2:30P Dictated by : MIMI RAUSCH MD This examination was interpreted and the report reviewed and electronically signed by: MIMI RAUSCH MD on Mar 28 2025  2:35PM  EST      Results for orders placed or performed during the hospital encounter of 04/13/25 (from the past 24 hours)   CBC and Auto Differential   Result Value Ref Range    WBC 7.1 4.4 - 11.3 x10*3/uL    nRBC 0.0 0.0 - 0.0 /100 WBCs    RBC 4.77 4.00 - 5.20  x10*6/uL    Hemoglobin 13.0 12.0 - 16.0 g/dL    Hematocrit 42.1 36.0 - 46.0 %    MCV 88 80 - 100 fL    MCH 27.3 26.0 - 34.0 pg    MCHC 30.9 (L) 32.0 - 36.0 g/dL    RDW 14.6 (H) 11.5 - 14.5 %    Platelets 307 150 - 450 x10*3/uL    Neutrophils % 65.7 40.0 - 80.0 %    Immature Granulocytes %, Automated 0.4 0.0 - 0.9 %    Lymphocytes % 21.5 13.0 - 44.0 %    Monocytes % 7.2 2.0 - 10.0 %    Eosinophils % 4.5 0.0 - 6.0 %    Basophils % 0.7 0.0 - 2.0 %    Neutrophils Absolute 4.64 1.20 - 7.70 x10*3/uL    Immature Granulocytes Absolute, Automated 0.03 0.00 - 0.70 x10*3/uL    Lymphocytes Absolute 1.52 1.20 - 4.80 x10*3/uL    Monocytes Absolute 0.51 0.10 - 1.00 x10*3/uL    Eosinophils Absolute 0.32 0.00 - 0.70 x10*3/uL    Basophils Absolute 0.05 0.00 - 0.10 x10*3/uL   Magnesium   Result Value Ref Range    Magnesium 1.96 1.60 - 2.40 mg/dL   Comprehensive metabolic panel   Result Value Ref Range    Glucose 123 (H) 74 - 99 mg/dL    Sodium 141 136 - 145 mmol/L    Potassium 3.6 3.5 - 5.3 mmol/L    Chloride 105 98 - 107 mmol/L    Bicarbonate 25 21 - 32 mmol/L    Anion Gap 15 10 - 20 mmol/L    Urea Nitrogen 13 6 - 23 mg/dL    Creatinine 0.90 0.50 - 1.05 mg/dL    eGFR 70 >60 mL/min/1.73m*2    Calcium 8.5 (L) 8.6 - 10.3 mg/dL    Albumin 4.0 3.4 - 5.0 g/dL    Alkaline Phosphatase 72 33 - 136 U/L    Total Protein 6.8 6.4 - 8.2 g/dL    AST 15 9 - 39 U/L    Bilirubin, Total 0.6 0.0 - 1.2 mg/dL    ALT 8 7 - 45 U/L   Troponin I, High Sensitivity, Initial   Result Value Ref Range    Troponin I, High Sensitivity 3 0 - 13 ng/L   B-Type Natriuretic Peptide   Result Value Ref Range    BNP 28 0 - 99 pg/mL   Troponin, High Sensitivity, 1 Hour   Result Value Ref Range    Troponin I, High Sensitivity 5 0 - 13 ng/L       Assessment/Plan   Assessment & Plan  Dizziness      Natasha Hargrove is a 70 y.o. female with past medical history of atrial fibrillation status post ablation, chronic allergic rhinitis, degenerative joint disease, GERD, intertrigo,  obesity, rosacea, hypovitaminosis D, hernia repair, hysterectomy, multiple hernia repairs presenting with chief complaint of dizziness.      #Concern for TIA versus vertigo  #Obesity  #Recent upper respiratory infection  #Ambulatory dysfunction  #Likely postviral cough    - Neurology consult, telemetry monitoring, check A1c, morning cholesterol, obtain echo, obtain MRI brain, CT angio imaging completed unremarkable  -Check orthostatics, will give fluids if orthostatic positive  -DVT prophylaxis with Lovenox  -Recheck viral swabs as patient had recent URI that did not improve much despite prolonged antibiotic course     #History of atrial fibrillation status post ablation  #History of chronic allergic rhinitis  #History of asthma  #History of GERD  #History of degenerative joint disease  #History of obesity  #History rosacea  #Status post multiple hernia repairs    - Continue home flecainide, inhalers, Singulair  - Hold home Lasix as patient may have orthostatic hypotension, check orthostatics      Julien Gracia, DO

## 2025-04-13 NOTE — CARE PLAN
The patient's goals for the shift include      The clinical goals for the shift include pt will remain safe and stable throughout shift    Over the shift, the patient did not make progress toward the following goals. Barriers to progression include acute illness. Recommendations to address these barriers include communication.

## 2025-04-13 NOTE — ED PROVIDER NOTES
History of Present Illness     Chief Complaint   Patient presents with   • Dizziness       History provided by: Patient  Limitations to History: None    HPI:  Natasha Hargrove is a 70 y.o. female presenting for evaluation of acute onset vertigo and double vision that started at 6 AM when she was on the toilet and looked down to get toilet paper, she is no longer having the symptoms.  She had not stood up yet when it occurred.  She has never had the symptoms prior.  She is having a right sided headache without radiation. She currently denies any chest pain, shortness of breath, nausea, vomiting, numbness or tingling in any extremities.  She has been treated for bronchitis recently and finished 10 days of Augmentin with her last dose being yesterday, she does still feel that she is having a cough, prior to the Augmentin she was having some dyspnea on exertion but that has improved.  She denies any fevers or chills.    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 Non toxic appearing.   Eyes: Pupils equally round and reactive to light, sclera normal, extraocular movements intact  Mouth: Mucus membranes moist  Neck: Full range of motion  Cardiovascular: Regular rate and rhythm without murmurs, Pulses equal throughout, no lower extremity edema  Respiratory: Breathing comfortably, Lungs clear to auscultation throughout, no focal wheeze, crackles, or rales  Abdomen: No overlying skin changes, no tenderness to palpation, no rebound or guarding  Skin: No rashes  Neuro: Extraocular movements in tact, normal speech, no facial asymmetry, no facial numbness, shoulder shrug equal bilaterally, sticks  tongue out midline, upper extremity strength normal bilaterally, lower extremity strength equal bilaterally, no dysmetria bilaterally with finger to nose, upper extremity sensation normal bilaterally, lower extremity sensation normal bilaterally    ED Course     ED Course as of 04/13/25 1539   Sun Apr 13, 2025   0719 I independently reviewed the12 lead ECG completed at 0715 showing normal sinus rhythm at a rate of 78 bpm. Axis is normal. R wave progressions across precordium is poor. There are no ST elevations/t wave inversions. SC, QRS, and QT intervals are appropriate. Low voltage QRS throughout.   [KV]   0936 XR chest 2 views  LUNGS:  Left lung base and CP angle are obscured by cardiac silhouette.   Left-sided small pleural effusion cannot be excluded.  No large pulmonary consolidation, pleural effusion or pneumothorax  within the visualized lungs.  ABDOMEN:  No remarkable upper abdominal findings.     BONES:  No acute osseous changes.  IMPRESSION:  Cardiomegaly with increased central pulmonary vascularity with  cephalization.  Left lung base and CP angle are obscured by cardiac silhouette.   Left-sided small pleural effusion cannot be excluded.  No large pulmonary consolidation, pleural effusion or pneumothorax  within the visualized lungs.  Findings likely due to pulmonary congestion.   [KV]   1537 CT angio head w and wo IV contrast  FINDINGS:          Anterior circulation: The bilateral intracranial internal carotid  arteries, bilateral carotid terminals, bilateral proximal anterior  and middle cerebral arteries are patent.      Posterior circulation: Bilateral intracranial vertebral arteries,  vertebrobasilar junction, basilar artery and proximal posterior  cerebral arteries are patent.          IMPRESSION:  No evidence for significant stenosis or large branch vessel cutoffs  of the intracranial vessels. MRI with diffusion-imaging would be a  more sensitive means of assessing for acute ischemic  injury,  particularly of the posterior fossa.   [KV]      ED Course User Index  [KV] Cathy Oseguera MD         Diagnoses as of 25 1539   Dizziness   Vertigo       Procedures   Procedures    Medical Decision Making   Medical Decision Makin y.o. female presenting for evaluation of acute onset vertigo concerning for posterior circulation etiology, therefore CT, CTA are ordered.  She does not have any acute findings on exam other than the horizontal nystagmus and her symptoms are resolving, therefore no indication for BAT activation.    CMP does not have any creatinine changes or electrolyte abnormalities, CBC without leukocytosis or anemia, troponin negative x 2, BNP negative.    CTA completed and does not show any CVA, however given that posterior circulation is under investigation she will require MRI for full evaluation.  Therefore she does require overnight stay for MRI and continued workup.    Need for observation stay is communicated to patient and her , they voiced understanding and are in agreement  ----            Disposition   Observation    Cathy Oseguera MD  Emergency Medicine       Cathy Oseguera MD  [phone]

## 2025-04-13 NOTE — PROGRESS NOTES
Pharmacy Medication History Review    Natasha Hargrove is a 70 y.o. female admitted for No Principal Problem: There is no principal problem currently on the Problem List. Please update the Problem List and refresh.. Pharmacy reviewed the patient's [id number] medications and allergies for accuracy.    The list below reflectives the updated PTA list. Please review each medication in order reconciliation for additional clarification and justification.  Prior to Admission medications    Medication Sig Start Date End Date Taking? Authorizing Provider   acetaminophen (Tylenol) 500 mg tablet Take 1 tablet (500 mg) by mouth once daily at bedtime.   Yes Historical Provider, MD   Bacillus coagulans (Probiotic, B. coagulans,) 1 billion cell tablet,chewable Chew 1 tablet once daily.   Yes Historical Provider, MD   bismuth subsalicylate (Pepto Bismol) 262 mg/15 mL suspension Take 15 mL (262 mg) by mouth every 6 hours if needed for indigestion.   Yes Historical Provider, MD   levalbuterol (Xopenex) 1.25 mg/3 mL nebulizer solution Take 1 ampule by nebulization every 4 hours if needed for wheezing or shortness of breath. 3/10/25  Yes Historical Provider, MD   levalbuterol (Xopenex) 45 mcg/actuation inhaler Inhale 2 puffs every 4 hours if needed for wheezing or shortness of breath. 3/10/25  Yes Historical Provider, MD   allopurinol (Zyloprim) 100 mg tablet Take by mouth.  Patient not taking: Reported on 4/13/2025   no Historical Provider, MD   Asmanex Twisthaler 220 mcg/ actuation (120) inhaler Inhale 2 puffs 2 times a day.   Yes Historical Provider, MD   ciclopirox (Penlac) 8 % solution Apply thin layer once daily to toenails.  Remove once weekly using alcohol. 9/25/24  Yes Jose Chance, DPM   diazePAM (Valium) 2 mg tablet Take 1 tablet (2 mg) by mouth as needed at bedtime.  Patient not taking: Reported on 4/13/2025 5/1/24  no Historical Provider, MD   estrogens, conjugated, (Premarin) vaginal cream Insert 1 g into the  vagina 2 times a week.  Patient not taking: Reported on 4/13/2025 11/1/23  no Historical Provider, MD   flecainide (Tambocor) 100 mg tablet Take 1 tablet (100 mg) by mouth every 12 hours.   Yes Historical Provider, MD   furosemide (Lasix) 40 mg tablet Take 1 tablet (40 mg) by mouth once daily. 3/10/25  Yes Historical Provider, MD   hydrocortisone (Anusol-HC) 2.5 % rectal cream Insert into the rectum 2 times a day as needed for hemorrhoids. 2/25/16  Yes Historical Provider, MD   ketoconazole (NIZOral) 2 % cream APPLY 1 APPLICATION TO AFFECTED AREA TWO TIMES A DAY FOR 14 DAYS.  Patient not taking: Reported on 4/13/2025 4/5/24  no Historical Provider, MD   methocarbamol (Robaxin) 500 mg tablet Take 1 tablet (500 mg) by mouth 3 times a day as needed. 3/10/25 4/13/25 Yes Historical Provider, MD   mometasone (Nasonex) 50 mcg/actuation nasal spray Administer 2 sprays into each nostril once daily.   Yes Historical Provider, MD   montelukast (Singulair) 10 mg tablet Take 1 tablet (10 mg) by mouth once daily.   Yes Historical Provider, MD   mupirocin (Bactroban) 2 % ointment APPLY 1 APPLICATION TO AFFECTED AREA THREE TIMES A DAY FOR 5 DAYS.  Patient not taking: Reported on 4/13/2025 11/13/23  no Historical Provider, MD   nystatin (Mycostatin) 100,000 unit/gram powder Apply 1 Application topically 3 times a day.  Patient not taking: Reported on 4/13/2025 9/13/23  no Historical Provider, MD   potassium chloride CR 10 mEq ER tablet Take 2 tablets (20 mEq) by mouth once daily. 8/27/24 4/13/25 Yes Historical Provider, MD   predniSONE (Deltasone) 20 mg tablet Take 2 tablets (40 mg) by mouth once daily. 4/7/25 4/13/25 Yes Historical Provider, MD   levalbuterol (Xopenex) 0.63 mg/3 mL nebulizer solution Inhale.  4/13/25 no Historical Provider, MD        The list below reflectives the updated allergy list. Please review each documented allergy for additional clarification and justification.  Allergies  Reviewed by Stephanie Chan on  4/13/2025        Severity Reactions Comments    Grass Pollen High Shortness of breath             Below are additional concerns with the patient's PTA list.      Stephanie Chan

## 2025-04-14 ENCOUNTER — APPOINTMENT (OUTPATIENT)
Dept: RADIOLOGY | Facility: HOSPITAL | Age: 70
End: 2025-04-14
Payer: COMMERCIAL

## 2025-04-14 ENCOUNTER — APPOINTMENT (OUTPATIENT)
Dept: CARDIOLOGY | Facility: HOSPITAL | Age: 70
End: 2025-04-14
Payer: COMMERCIAL

## 2025-04-14 LAB
ANION GAP SERPL CALC-SCNC: 10 MMOL/L (ref 10–20)
BASOPHILS # BLD AUTO: 0.05 X10*3/UL (ref 0–0.1)
BASOPHILS NFR BLD AUTO: 0.8 %
BUN SERPL-MCNC: 11 MG/DL (ref 6–23)
CALCIUM SERPL-MCNC: 8.4 MG/DL (ref 8.6–10.3)
CHLORIDE SERPL-SCNC: 107 MMOL/L (ref 98–107)
CHOLEST SERPL-MCNC: 149 MG/DL (ref 0–199)
CHOLESTEROL/HDL RATIO: 4.7
CO2 SERPL-SCNC: 28 MMOL/L (ref 21–32)
CREAT SERPL-MCNC: 0.91 MG/DL (ref 0.5–1.05)
EGFRCR SERPLBLD CKD-EPI 2021: 69 ML/MIN/1.73M*2
EOSINOPHIL # BLD AUTO: 0.41 X10*3/UL (ref 0–0.7)
EOSINOPHIL NFR BLD AUTO: 6.2 %
ERYTHROCYTE [DISTWIDTH] IN BLOOD BY AUTOMATED COUNT: 14.9 % (ref 11.5–14.5)
EST. AVERAGE GLUCOSE BLD GHB EST-MCNC: 134 MG/DL
GLUCOSE SERPL-MCNC: 112 MG/DL (ref 74–99)
HBA1C MFR BLD: 6.3 %
HCT VFR BLD AUTO: 36.2 % (ref 36–46)
HDLC SERPL-MCNC: 31.9 MG/DL
HGB BLD-MCNC: 11.4 G/DL (ref 12–16)
IMM GRANULOCYTES # BLD AUTO: 0.03 X10*3/UL (ref 0–0.7)
IMM GRANULOCYTES NFR BLD AUTO: 0.5 % (ref 0–0.9)
LDLC SERPL CALC-MCNC: 89 MG/DL
LYMPHOCYTES # BLD AUTO: 1.72 X10*3/UL (ref 1.2–4.8)
LYMPHOCYTES NFR BLD AUTO: 26.1 %
MAGNESIUM SERPL-MCNC: 2.08 MG/DL (ref 1.6–2.4)
MCH RBC QN AUTO: 27.5 PG (ref 26–34)
MCHC RBC AUTO-ENTMCNC: 31.5 G/DL (ref 32–36)
MCV RBC AUTO: 87 FL (ref 80–100)
MONOCYTES # BLD AUTO: 0.62 X10*3/UL (ref 0.1–1)
MONOCYTES NFR BLD AUTO: 9.4 %
NEUTROPHILS # BLD AUTO: 3.77 X10*3/UL (ref 1.2–7.7)
NEUTROPHILS NFR BLD AUTO: 57 %
NON HDL CHOLESTEROL: 117 MG/DL (ref 0–149)
NRBC BLD-RTO: 0 /100 WBCS (ref 0–0)
PLATELET # BLD AUTO: 271 X10*3/UL (ref 150–450)
POTASSIUM SERPL-SCNC: 3.8 MMOL/L (ref 3.5–5.3)
RBC # BLD AUTO: 4.15 X10*6/UL (ref 4–5.2)
SODIUM SERPL-SCNC: 141 MMOL/L (ref 136–145)
TRIGL SERPL-MCNC: 140 MG/DL (ref 0–149)
VLDL: 28 MG/DL (ref 0–40)
WBC # BLD AUTO: 6.6 X10*3/UL (ref 4.4–11.3)

## 2025-04-14 PROCEDURE — 2500000004 HC RX 250 GENERAL PHARMACY W/ HCPCS (ALT 636 FOR OP/ED)

## 2025-04-14 PROCEDURE — 2500000001 HC RX 250 WO HCPCS SELF ADMINISTERED DRUGS (ALT 637 FOR MEDICARE OP)

## 2025-04-14 PROCEDURE — 93306 TTE W/DOPPLER COMPLETE: CPT | Performed by: STUDENT IN AN ORGANIZED HEALTH CARE EDUCATION/TRAINING PROGRAM

## 2025-04-14 PROCEDURE — 2500000004 HC RX 250 GENERAL PHARMACY W/ HCPCS (ALT 636 FOR OP/ED): Performed by: STUDENT IN AN ORGANIZED HEALTH CARE EDUCATION/TRAINING PROGRAM

## 2025-04-14 PROCEDURE — 93005 ELECTROCARDIOGRAM TRACING: CPT

## 2025-04-14 PROCEDURE — 2500000001 HC RX 250 WO HCPCS SELF ADMINISTERED DRUGS (ALT 637 FOR MEDICARE OP): Performed by: STUDENT IN AN ORGANIZED HEALTH CARE EDUCATION/TRAINING PROGRAM

## 2025-04-14 PROCEDURE — 99223 1ST HOSP IP/OBS HIGH 75: CPT

## 2025-04-14 PROCEDURE — 36415 COLL VENOUS BLD VENIPUNCTURE: CPT

## 2025-04-14 PROCEDURE — 70551 MRI BRAIN STEM W/O DYE: CPT | Performed by: RADIOLOGY

## 2025-04-14 PROCEDURE — 80048 BASIC METABOLIC PNL TOTAL CA: CPT

## 2025-04-14 PROCEDURE — 83735 ASSAY OF MAGNESIUM: CPT

## 2025-04-14 PROCEDURE — 93306 TTE W/DOPPLER COMPLETE: CPT

## 2025-04-14 PROCEDURE — 99232 SBSQ HOSP IP/OBS MODERATE 35: CPT | Performed by: STUDENT IN AN ORGANIZED HEALTH CARE EDUCATION/TRAINING PROGRAM

## 2025-04-14 PROCEDURE — 70551 MRI BRAIN STEM W/O DYE: CPT

## 2025-04-14 PROCEDURE — 83036 HEMOGLOBIN GLYCOSYLATED A1C: CPT | Mod: PARLAB | Performed by: PSYCHIATRY & NEUROLOGY

## 2025-04-14 PROCEDURE — 94640 AIRWAY INHALATION TREATMENT: CPT

## 2025-04-14 PROCEDURE — 97165 OT EVAL LOW COMPLEX 30 MIN: CPT | Mod: GO

## 2025-04-14 PROCEDURE — 2500000002 HC RX 250 W HCPCS SELF ADMINISTERED DRUGS (ALT 637 FOR MEDICARE OP, ALT 636 FOR OP/ED)

## 2025-04-14 PROCEDURE — 96372 THER/PROPH/DIAG INJ SC/IM: CPT

## 2025-04-14 PROCEDURE — 85025 COMPLETE CBC W/AUTO DIFF WBC: CPT

## 2025-04-14 PROCEDURE — 80061 LIPID PANEL: CPT

## 2025-04-14 PROCEDURE — G0378 HOSPITAL OBSERVATION PER HR: HCPCS

## 2025-04-14 PROCEDURE — 97161 PT EVAL LOW COMPLEX 20 MIN: CPT | Mod: GP

## 2025-04-14 RX ORDER — CLOPIDOGREL BISULFATE 75 MG/1
75 TABLET ORAL DAILY
Status: DISCONTINUED | OUTPATIENT
Start: 2025-04-15 | End: 2025-04-15 | Stop reason: HOSPADM

## 2025-04-14 RX ORDER — ATORVASTATIN CALCIUM 40 MG/1
40 TABLET, FILM COATED ORAL NIGHTLY
Status: DISCONTINUED | OUTPATIENT
Start: 2025-04-14 | End: 2025-04-15 | Stop reason: HOSPADM

## 2025-04-14 RX ORDER — CLOPIDOGREL BISULFATE 75 MG/1
300 TABLET ORAL ONCE
Status: COMPLETED | OUTPATIENT
Start: 2025-04-14 | End: 2025-04-14

## 2025-04-14 RX ADMIN — ALBUTEROL SULFATE 1.25 MG: 2.5 SOLUTION RESPIRATORY (INHALATION) at 08:47

## 2025-04-14 RX ADMIN — FLECAINIDE ACETATE 100 MG: 100 TABLET ORAL at 08:59

## 2025-04-14 RX ADMIN — CLOPIDOGREL BISULFATE 300 MG: 75 TABLET, FILM COATED ORAL at 15:36

## 2025-04-14 RX ADMIN — ENOXAPARIN SODIUM 60 MG: 60 INJECTION SUBCUTANEOUS at 21:57

## 2025-04-14 RX ADMIN — ACETAMINOPHEN 650 MG: 325 TABLET, FILM COATED ORAL at 01:13

## 2025-04-14 RX ADMIN — BISMUTH SUBSALICYLATE 262 MG: 525 LIQUID ORAL at 15:39

## 2025-04-14 RX ADMIN — MOMETASONE FUROATE 2 PUFF: 220 INHALANT RESPIRATORY (INHALATION) at 08:49

## 2025-04-14 RX ADMIN — SODIUM CHLORIDE, POTASSIUM CHLORIDE, SODIUM LACTATE AND CALCIUM CHLORIDE 100 ML/HR: 600; 310; 30; 20 INJECTION, SOLUTION INTRAVENOUS at 14:40

## 2025-04-14 RX ADMIN — MOMETASONE FUROATE 2 PUFF: 220 INHALANT RESPIRATORY (INHALATION) at 19:30

## 2025-04-14 RX ADMIN — ATORVASTATIN CALCIUM 40 MG: 40 TABLET, FILM COATED ORAL at 21:57

## 2025-04-14 RX ADMIN — FLECAINIDE ACETATE 100 MG: 100 TABLET ORAL at 22:00

## 2025-04-14 RX ADMIN — BISMUTH SUBSALICYLATE 262 MG: 525 LIQUID ORAL at 21:55

## 2025-04-14 RX ADMIN — MONTELUKAST 10 MG: 10 TABLET, FILM COATED ORAL at 08:59

## 2025-04-14 RX ADMIN — ACETAMINOPHEN 650 MG: 325 TABLET, FILM COATED ORAL at 15:36

## 2025-04-14 ASSESSMENT — COGNITIVE AND FUNCTIONAL STATUS - GENERAL
DAILY ACTIVITIY SCORE: 19
WALKING IN HOSPITAL ROOM: A LITTLE
MOVING TO AND FROM BED TO CHAIR: A LITTLE
WALKING IN HOSPITAL ROOM: A LITTLE
STANDING UP FROM CHAIR USING ARMS: A LITTLE
HELP NEEDED FOR BATHING: A LITTLE
MOBILITY SCORE: 22
DRESSING REGULAR LOWER BODY CLOTHING: A LOT
MOVING FROM LYING ON BACK TO SITTING ON SIDE OF FLAT BED WITH BEDRAILS: A LITTLE
CLIMB 3 TO 5 STEPS WITH RAILING: A LITTLE
DRESSING REGULAR UPPER BODY CLOTHING: A LITTLE
TURNING FROM BACK TO SIDE WHILE IN FLAT BAD: A LITTLE
MOBILITY SCORE: 18
TOILETING: A LITTLE
DAILY ACTIVITIY SCORE: 24
CLIMB 3 TO 5 STEPS WITH RAILING: A LITTLE

## 2025-04-14 ASSESSMENT — ENCOUNTER SYMPTOMS
WHEEZING: 0
ABDOMINAL PAIN: 0
HEMATURIA: 0
HEADACHES: 0
VOMITING: 0
FLANK PAIN: 0
NUMBNESS: 0
CHILLS: 0
DIARRHEA: 0
COUGH: 0
SHORTNESS OF BREATH: 0
CONSTIPATION: 0
PALPITATIONS: 0
WEAKNESS: 0
SPEECH DIFFICULTY: 0
ARTHRALGIAS: 0
MYALGIAS: 0
NAUSEA: 0
FEVER: 0
DIFFICULTY URINATING: 0

## 2025-04-14 ASSESSMENT — PAIN DESCRIPTION - LOCATION: LOCATION: HEAD

## 2025-04-14 ASSESSMENT — PAIN SCALES - GENERAL
PAINLEVEL_OUTOF10: 0 - NO PAIN
PAINLEVEL_OUTOF10: 4
PAINLEVEL_OUTOF10: 0 - NO PAIN

## 2025-04-14 ASSESSMENT — ACTIVITIES OF DAILY LIVING (ADL): ADL_ASSISTANCE: INDEPENDENT

## 2025-04-14 ASSESSMENT — PAIN - FUNCTIONAL ASSESSMENT
PAIN_FUNCTIONAL_ASSESSMENT: 0-10

## 2025-04-14 NOTE — PROGRESS NOTES
Spiritual Care Visit   Notes:  Ms. Hargrove welcomed a visit. This was a patient-centered visit. She used the agency of her voice to share her narrative. She shared about her present health issue. She expressed her emotions. She has 2 dtrs and showed me a picture of them. She talked about her work and hobbies. She shared her emotions and was tearful at times. I created time and space for active, empathic listening. I intervened with emotional, spiritual care and maintained a non-anxious, nonjudgmental presence. This  offered opportunity for a holistic approach to wellness as whole-person care and allowed for integrative healing of the body, mind and spirit. There are no other needs.  I integrated, collaborated, and consulted with the interdisciplinary team members.  She expressed gratitude for this visit.  Spiritual Care Request    Reason for Visit:  Routine Visit: Introduction  Continue Visiting: No   Request Received From:  Referral From: Nurse  Focus of Care:  Visited With: Patient, Family   Refer to :  Spiritual Care Assessment    Spiritual Assessment:  Patient Spiritual Care Encounters  Suffering Severity: Substantial  Fear Level: Moderate  Feelings of Hopelessness: Moderate  Coping: Sometimes demonstrated  Family Spiritual Care Encounters  Family Participation in Care: Sometimes demonstrated  Care Provided:  Intended Effects: Establish rapport and connectedness  Methods: Educate care team about cultural and Protestant values  Interventions: Explain  role, Ask guided questions about yulia  Sense of Community and or Cheondoism Affiliation:  Mandaeism    Addressed Needs/Concerns and/or Daniel Through:  Cheondoism Encounters  Cheondoism Needs: Sacred text, Literature  Outcome:  Outcome of Spiritual Care Visit: Peace/gratitude   Advance Directives:  Spiritual Care Annotation    Annotation:    Patient: Natasha Hargrove    Date: 4/14/2025  Time: 4:29 PM  Total time (min.): 50    I offered  instruction on how to reach out to the Spiritual Care Department for future needs. I remain available upon request.  [facility] Department of Spiritual Care Contact #: [phone]  Signed by: Rev. Aurea Del Toro ThM, MA

## 2025-04-14 NOTE — PROGRESS NOTES
Physical Therapy    Physical Therapy Evaluation    Patient Name: Natasha Hargrove  MRN: [id number]  [id number]  Today's Date: 4/14/2025   Time Calculation  Start Time: 0939  Stop Time: 0955  Time Calculation (min): 16 min    Assessment/Plan   PT Assessment  PT Assessment Results: Decreased mobility  Rehab Prognosis: Good  Barriers to Discharge Home: No anticipated barriers  End of Session Communication: Bedside nurse  Assessment Comment: Pt is presenting with a decline in functional mobility from baseline. Pt would benefit from further PT services to address these deficits to return to prior level of function.  End of Session Patient Position:  (in WC with transport)  IP OR SWING BED PT PLAN  Inpatient or Swing Bed: Inpatient  PT Plan  Treatment/Interventions: Bed mobility, Transfer training, Gait training, Balance training, Strengthening, Endurance training, Therapeutic exercise, Therapeutic activity  PT Plan: Ongoing PT  PT Frequency: 2 times per week  PT Discharge Recommendations: Low intensity level of continued care  PT Recommended Transfer Status: Stand by assist  PT - OK to Discharge: Yes    Subjective     Current Problem:  Patient Active Problem List   Diagnosis    Dizziness       General Visit Information:  General  Reason for Referral: PT eval and treat; dizziness, double vision, concern for TIA vs vertigo, CXR: vascular congestion, CT head/neck unremarkable  Referred By: Cathy Oseguera  Past Medical History Relevant to Rehab: GERD, a fib, s/p ablation, chronic allergic rhinitis, degenerative joint disease, intertrigo, obesity, rosacea, hernia repair, hysterectomy, asthma  Family/Caregiver Present: Yes ()  Co-Treatment: OT  Co-Treatment Reason: For patient safety and to maximize mobility  Prior to Session Communication: Bedside nurse  Patient Position Received: Bed, 2 rail up, Alarm off, not on at start of session  General Comment: Pt resting in bed upon entering, agreeable to PT    Home Living:  Home  Living  Type of Home: House  Lives With: Spouse  Home Adaptive Equipment: Cane (rollator)  Home Layout: One level, Laundry in basement  Home Access: Stairs to enter with rails  Entrance Stairs-Number of Steps: 1+3 ELIANA  Bathroom Shower/Tub: Walk-in shower  Bathroom Toilet: Handicapped height  Bathroom Equipment: Grab bars in shower, Built-in shower seat, Hand-held shower hose    Prior Level of Function:  Prior Function Per Pt/Caregiver Report  Level of [place]: Independent with ADLs and functional transfers  ADL Assistance: Independent  Homemaking Assistance:  ( completes most IADLs)  Ambulatory Assistance: Independent (furniture walks in house, uses cane for community)  Vocational: Full time employment ()  Prior Function Comments: (+) driving    Precautions:  Precautions  Precautions Comment: no activity restrictions, aspiration precautions       Objective     Pain:  Pain Assessment  Pain Assessment: 0-10  0-10 (Numeric) Pain Score: 0 - No pain    Cognition:  Cognition  Overall Cognitive Status: Within Functional Limits  Orientation Level: Oriented X4    General Assessments:      Activity Tolerance  Endurance: Endurance does not limit participation in activity  Sensation  Light Touch: No apparent deficits  Strength  Strength Comments: BLE WFL for strength and MMT  Perception  Inattention/Neglect: Appears intact  Coordination  Movements are Fluid and Coordinated: Yes  Postural Control  Postural Control: Within Functional Limits  Static Sitting Balance  Static Sitting-Level of Assistance: Independent  Dynamic Sitting Balance  Dynamic Sitting-Level of Assistance: Distant supervision  Static Standing Balance  Static Standing-Level of Assistance: Close supervision  Dynamic Standing Balance  Dynamic Standing-Level of Assistance: Contact guard    Functional Assessments:     Bed Mobility  Bed Mobility: Yes  Bed Mobility 1  Bed Mobility Comments 1: supine<>sit SBA with HOB  raised  Transfers  Transfer: Yes  Transfer 1  Trials/Comments 1: sit to stand CGA with cane in hand, cues for safety  Ambulation/Gait Training  Ambulation/Gait Training Performed:  (Pt ambulates 40ft total during session with CGA/SBA with straight cane. Pt demo's lateral sway which she reports is baseline, but with no acute LOB noted. Pt ambulates to  to transfer off floor for test)          Extremity/Trunk Assessments:        RLE   RLE : Within Functional Limits  LLE   LLE : Within Functional Limits    Outcome Measures:  [facility] Basic Mobility  Turning from your back to your side while in a flat bed without using bedrails: A little  Moving from lying on your back to sitting on the side of a flat bed without using bedrails: A little  Moving to and from bed to chair (including a wheelchair): A little  Standing up from a chair using your arms (e.g. wheelchair or bedside chair): A little  To walk in hospital room: A little  Climbing 3-5 steps with railing: A little  Basic Mobility - Total Score: 18                            Goals:  Encounter Problems       Encounter Problems (Active)       PT Problem       PT Goal 1 STG - Pt will amb >100' using LRD with MOD IND   (Progressing)       Start:  04/14/25    Expected End:  04/28/25            PT Goal 2 STG -  Pt will navigate 4 stairs using rail with SUP  (Progressing)       Start:  04/14/25    Expected End:  04/28/25            PT Goal 3 STG - Pt will transfer STS with MOD IND   (Progressing)       Start:  04/14/25    Expected End:  04/28/25               Pain - Adult            Education Documentation  Mobility Training, taught by Marta Vasquez PT at 4/14/2025  1:28 PM.  Learner: Patient  Readiness: Acceptance  Method: Explanation  Response: Verbalizes Understanding  Comment: benefit of mobility    Education Comments  No comments found.

## 2025-04-14 NOTE — CARE PLAN
T  Problem: Pain - Adult  Goal: Verbalizes/displays adequate comfort level or baseline comfort level  Outcome: Progressing     Problem: Safety - Adult  Goal: Free from fall injury  Outcome: Progressing     Problem: Fall/Injury  Goal: Not fall by end of shift  Outcome: Progressing  Goal: Be free from injury by end of the shift  Outcome: Progressing

## 2025-04-14 NOTE — CONSULTS
Inpatient consult to Neurology  Consult performed by: Jules Jalloh DO  Consult ordered by: Cathy Oseguera MD  Reason for consult: double vision and dizziness        History Of Present Illness  Natasha Hargrove is a 70 y.o. year old female with past medical history significant for A-fib s/p ablation, DJD, GERD, obesity, who presents to [facility] in  with chief complaint of transient dizziness and double vision.  She was using the restroom and when she picked her head up she started experiencing double vision and room spinning sensation.  This lasted roughly 10 minutes and resolved spontaneously after sitting down on the couch.  Endorse some mild headache when her double vision resolved.  Denied any slurring of speech, weakness/numbness tingling, hearing loss/tinnitus.  Did state that ambulation was more difficult due to her double vision.  Denies this ever happening before.  Did say that her mom and sister had a history of stroke.  Additionally of note, patient has a history of chronic allergic rhinitis and recently completed 10-day course of Augmentin.  She also states that she has gotten worked up in the past with sleep study for her ABDIRAHMAN.  She says that this was a frustrating workup and they gave her no definitive response and apparently the results were lost.  She does not use CPAP but does sleep in a recliner due to her asthma.  Neurology was consulted for concerns of TIA versus transient vertigo.    CT head negative on admission.  CTA head/neck was grossly unremarkable.  MRI was positive for a small acute stroke in the left thalamic region.  CMP, CBC, lipids, viral panel all within normal limits and unremarkable.    A 10 point ROS was performed with the patient denying any complaint at this time aside from those listed in the HPI above.     PMH: as above  PSH: as above  FH: Father  ALS history, mother  stroke history, sister alive with stroke history  SH: denies smoking, denies EtOH, denies illicit  drug use    Past Medical History  Past Medical History:   Diagnosis Date    Personal history of other diseases of the respiratory system 2015    History of asthma    Unilateral primary osteoarthritis, left knee 2015    Degenerative arthritis of left knee     Surgical History  Past Surgical History:   Procedure Laterality Date    AV NODE ABLATION  2016    Catheter Ablation Atrial Fibrillation     SECTION, CLASSIC  2015     Section    HERNIA REPAIR  2015    Hernia Repair Inguinal Bilateral    OTHER SURGICAL HISTORY  2015    Hysterotomy (Obstetrical)    OTHER SURGICAL HISTORY  2015    Foot Surgery Left     Social History  Social History     Tobacco Use    Smoking status: Never    Smokeless tobacco: Never   Substance Use Topics    Alcohol use: Never    Drug use: Never     Allergies  Grass pollen  Medications Prior to Admission   Medication Sig Dispense Refill Last Dose/Taking    acetaminophen (Tylenol) 500 mg tablet Take 1 tablet (500 mg) by mouth once daily at bedtime.   2025 Evening    Asmanex Twisthaler 220 mcg/ actuation (120) inhaler Inhale 2 puffs 2 times a day.   2025 Evening    Bacillus coagulans (Probiotic, B. coagulans,) 1 billion cell tablet,chewable Chew 1 tablet once daily.   2025    bismuth subsalicylate (Pepto Bismol) 262 mg/15 mL suspension Take 15 mL (262 mg) by mouth every 6 hours if needed for indigestion.   2025    ciclopirox (Penlac) 8 % solution Apply thin layer once daily to toenails.  Remove once weekly using alcohol. 6.6 mL 1 2025    flecainide (Tambocor) 100 mg tablet Take 1 tablet (100 mg) by mouth every 12 hours.   2025 Bedtime    furosemide (Lasix) 40 mg tablet Take 1 tablet (40 mg) by mouth once daily.   2025 Morning    hydrocortisone (Anusol-HC) 2.5 % rectal cream Insert into the rectum 2 times a day as needed for hemorrhoids.   Past Week    levalbuterol (Xopenex) 1.25 mg/3 mL nebulizer solution  Take 1 ampule by nebulization every 4 hours if needed for wheezing or shortness of breath.   2025 Evening    levalbuterol (Xopenex) 45 mcg/actuation inhaler Inhale 2 puffs every 4 hours if needed for wheezing or shortness of breath.   Past Week    methocarbamol (Robaxin) 500 mg tablet Take 1 tablet (500 mg) by mouth 3 times a day as needed.   Past Month    mometasone (Nasonex) 50 mcg/actuation nasal spray Administer 2 sprays into each nostril once daily.   2025    montelukast (Singulair) 10 mg tablet Take 1 tablet (10 mg) by mouth once daily.   2025    [] potassium chloride CR 10 mEq ER tablet Take 2 tablets (20 mEq) by mouth once daily.   2025    [] predniSONE (Deltasone) 20 mg tablet Take 2 tablets (40 mg) by mouth once daily.   Past Month    allopurinol (Zyloprim) 100 mg tablet Take by mouth. (Patient not taking: Reported on 2025)   Not Taking    diazePAM (Valium) 2 mg tablet Take 1 tablet (2 mg) by mouth as needed at bedtime. (Patient not taking: Reported on 2025)   More than a month    estrogens, conjugated, (Premarin) vaginal cream Insert 1 g into the vagina 2 times a week. (Patient not taking: Reported on 2025)   Not Taking    ketoconazole (NIZOral) 2 % cream APPLY 1 APPLICATION TO AFFECTED AREA TWO TIMES A DAY FOR 14 DAYS. (Patient not taking: Reported on 2025)   Not Taking    mupirocin (Bactroban) 2 % ointment APPLY 1 APPLICATION TO AFFECTED AREA THREE TIMES A DAY FOR 5 DAYS. (Patient not taking: Reported on 2025)   Not Taking    nystatin (Mycostatin) 100,000 unit/gram powder Apply 1 Application topically 3 times a day. (Patient not taking: Reported on 2025)   Not Taking       Review of Systems   Constitutional:  Negative for chills and fever.   HENT:  Negative for hearing loss and tinnitus.    Eyes:  Negative for visual disturbance.   Respiratory:  Negative for cough, shortness of breath and wheezing.    Cardiovascular:  Negative for chest  pain and palpitations.   Gastrointestinal:  Negative for abdominal pain, constipation, diarrhea, nausea and vomiting.   Genitourinary:  Negative for difficulty urinating, flank pain and hematuria.   Musculoskeletal:  Negative for arthralgias and myalgias.   Neurological:  Negative for syncope, speech difficulty, weakness, numbness and headaches.     Neurological Exam  Physical Exam  GENERAL APPEARANCE:  No distress, alert and cooperative.   CARDIOVASCULAR: Regular, rate and rhythm, without murmur. No edema, or tenderness to palpation.  MENTAL STATE:  Orientation was normal to time, place and person. Recent and remote memory was intact.  Attention span and concentration were normal. Language testing was normal for comprehension, repetition, expression, and naming. General fund of knowledge was intact.  CRANIAL NERVES:  Cranial nerves were normal.      CN 2- Visual Acuity  OD: 20/20 (corrected)   OS: 20/20 (corrected); visual fields full to confrontation.      CN 3, 4, 6- PERRLA. no ptosis. EOMs normal alignment, full range of movement, no nystagmus     CN 5- Facial sensation intact bilaterally     CN 7- Normal and symmetric facial strength. Nasolabial folds symmetric.     CN 8- Hearing intact     CN 9- Palate elevates symmetrically     CN 11- Normal strength of shoulder shrug and neck turning      CN 12- Tongue midline, with normal bulk and strength; no fasciculations.   MOTOR:  Motor exam was normal. Muscle bulk and tone were normal in both upper and lower extremities. Muscle strength was 5/5 in distal and proximal muscles in both upper and lower extremities. No fasciculations, tremor or other abnormal movements were present.   REFLEXES:  Right/ Left:  Biceps 2/2, brachioradialis 2/2, triceps 2/2, patellar 2/2, ankle 2/2  SENSORY: Sensory exam was intact to light touch, sharp/dull, vibration and position sense in both UE and LE.   COORDINATION: TOM were intact in upper and lower extremities.  In UE-  "finger-nose-finger was intact and in LE- heel-to-shin was intact without dysmetria or overshoot.    Last Recorded Vitals  Blood pressure 134/62, pulse 66, temperature 35.9 °C (96.6 °F), temperature source Temporal, resp. rate 17, height 1.6 m (5' 3\"), weight 132 kg (290 lb), SpO2 92%.    Relevant Results      NIH Stroke Scale  1A. Level of Consciousness: Alert, Keenly Responsive  1B. Ask Month and Age: Both Questions Right  1C. Blink Eyes & Squeeze Hands: Performs Both Tasks  2. Best Gaze: Normal  3. Visual: No Visual Loss  4. Facial Palsy: Normal Symmetrical Movements  5A. Motor - Left Arm: No Drift  5B. Motor - Right Arm: No Drift  6A. Motor - Left Leg: No Drift  6B. Motor - Right Leg: No Drift  7. Limb Ataxia: Absent  8. Sensory Loss: Normal  9. Best Language: No Aphasia  10. Dysarthria: Normal  11. Extinction and Inattention: No Abnormality  NIH Stroke Scale: 0     I have personally reviewed the following imaging results Imaging  CT angio head w and wo IV contrast    Result Date: 4/13/2025  No evidence for significant stenosis or large branch vessel cutoffs of the intracranial vessels. MRI with diffusion-imaging would be a more sensitive means of assessing for acute ischemic injury, particularly of the posterior fossa.     MACRO: None   Signed by: Mary Olsen 4/13/2025 12:11 PM Dictation workstation:   [id number]    CT head wo IV contrast    Result Date: 4/13/2025  No acute intracranial abnormality.   MACRO: None.   Signed by: Avani Mason 4/13/2025 11:04 AM Dictation workstation:   [id number]    XR chest 2 views    Result Date: 4/13/2025  Cardiomegaly with increased central pulmonary vascularity with cephalization. Left lung base and CP angle are obscured by cardiac silhouette. Left-sided small pleural effusion cannot be excluded. No large pulmonary consolidation, pleural effusion or pneumothorax within the visualized lungs. Findings likely due to pulmonary congestion. Signed by Bran Guzman MD "     Cardiology, Vascular, and Other Imaging  MR brain wo IV contrast    Result Date: 4/14/2025  Interpreted By:  Josseline Mendez, STUDY: MR BRAIN WO IV CONTRAST   INDICATION: Signs/Symptoms:TIA   COMPARISON: CT head 04/13/2025.   ACCESSION NUMBER(S): [id number]   ORDERING CLINICIAN: JUVE DEL TORO   TECHNIQUE: Multi-planar multi-sequential MR imaging of the brain was performed without intravenous contrast.   FINDINGS: VENTRICLES and EXTRA-AXIAL SPACES: No hydrocephalus.No abnormal extra-axial fluid collection. Basal cisterns are patent.   BRAIN PARENCHYMA:Mild periventricular/subcortical white matter areas of hypodensity are nonspecific but may be related to small vessel ischemic disease.Small acute infarct in the left thalamus. Chronic infarct in the left cerebellum. No mass or mass effect. No midline shift.   PARANASAL SINUSES/MASTOIDS: Retention cyst in the left maxillary sinus, otherwise, the visualized paranasal sinuses are clear.The middle ears and mastoid air cells are clear.   OSSEOUS STRUCTURES: No suspicious osseous lesions.   OTHER: None.   IMPRESSION 1. Small acute infarct in the left thalamus. No evidence of hemorrhage.   I personally reviewed the images/study and I agree with the findings as stated.   MACRO: Critical Finding:  See findings. Notification was initiated on 4/14/2025 at 11:30 am by  Josseline Mendez via secure epic chat to Jose Del Toro and Basil. (**-[facility]-**)   Signed by: Josseline Mendez 4/14/2025 11:33 AM Dictation workstation:   [id number]      Assessment/Plan   Assessment & Plan  Dizziness      #Small acute ischemic stroke involving left thalamic region  - Double vision and room spinning sensation resolved spontaneously after 10 minutes at home.  - Lipid panel within normal limit  - CT head negative, CTA head/neck unremarkable  - MRI brain: Small acute infarct in left thalamus.  No evidence of hemorrhage  PLAN:  - Recommend starting DAPT.  This should be continued for 21 days.  - Continue statin therapy  -  Follow-up on A1c  - Follow-up on echocardiogram results  - Preliminary review of telemetry shows no active or captured A-fib since admission.  This would be an uncommon territory involvement for A-fib related stroke.  - Upon review of chart I see that patient had sleep study done in May 2024 however due to chart restrictions cannot see results.  Will recommend nursing staff that an overnight pulse ox test be performed.  Would recommend repeat sleep study evaluation upon discharge in the outpatient setting.  - Will continue to follow while in the inpatient setting    Other issues:  #Recurrent chronic allergic rhinitis  #Hx A-fib s/p ablation  #obesity  #ABDIRAHMAN    Jules Jalloh,   PGY-2, Internal Medicine  Please SecureChat for any further questions  This is a preliminary note, please await attending attestation for final A/P

## 2025-04-14 NOTE — PROGRESS NOTES
"Natasha Hargrove is a 70 y.o. female on day 0 of admission presenting with Dizziness.    Subjective   No acute events overnight.        Objective     Physical Exam  Constitutional:       Appearance: She is obese. She is not ill-appearing.   HENT:      Head: Normocephalic and atraumatic.   Eyes:      Extraocular Movements: Extraocular movements intact.      Pupils: Pupils are equal, round, and reactive to light.   Cardiovascular:      Rate and Rhythm: Normal rate and regular rhythm.   Abdominal:      General: Bowel sounds are normal.      Tenderness: There is no guarding.   Musculoskeletal:      Right lower leg: Edema present.      Left lower leg: Edema present.   Skin:     General: Skin is warm and dry.   Neurological:      Cranial Nerves: No cranial nerve deficit.      Motor: No weakness.   NIH: 0  Psychiatric:         Mood and Affect: Mood normal.         Behavior: Behavior normal.       Last Recorded Vitals  Blood pressure 134/62, pulse 66, temperature 35.9 °C (96.6 °F), temperature source Temporal, resp. rate 17, height 1.6 m (5' 3\"), weight 132 kg (290 lb), SpO2 92%.  Intake/Output last 3 Shifts:  I/O last 3 completed shifts:  In: 1130 (8.6 mL/kg) [P.O.:480; I.V.:650 (4.9 mL/kg)]  Out: - (0 mL/kg)   Weight: 131.5 kg     Relevant Results  Results for orders placed or performed during the hospital encounter of 04/13/25 (from the past 24 hours)   Sars-CoV-2 and Influenza A/B PCR   Result Value Ref Range    Flu A Result Not Detected Not Detected    Flu B Result Not Detected Not Detected    Coronavirus 2019, PCR Not Detected Not Detected   RSV PCR   Result Value Ref Range    RSV PCR Not Detected Not Detected   CBC and Auto Differential   Result Value Ref Range    WBC 6.6 4.4 - 11.3 x10*3/uL    nRBC 0.0 0.0 - 0.0 /100 WBCs    RBC 4.15 4.00 - 5.20 x10*6/uL    Hemoglobin 11.4 (L) 12.0 - 16.0 g/dL    Hematocrit 36.2 36.0 - 46.0 %    MCV 87 80 - 100 fL    MCH 27.5 26.0 - 34.0 pg    MCHC 31.5 (L) 32.0 - 36.0 g/dL    RDW 14.9 " (H) 11.5 - 14.5 %    Platelets 271 150 - 450 x10*3/uL    Neutrophils % 57.0 40.0 - 80.0 %    Immature Granulocytes %, Automated 0.5 0.0 - 0.9 %    Lymphocytes % 26.1 13.0 - 44.0 %    Monocytes % 9.4 2.0 - 10.0 %    Eosinophils % 6.2 0.0 - 6.0 %    Basophils % 0.8 0.0 - 2.0 %    Neutrophils Absolute 3.77 1.20 - 7.70 x10*3/uL    Immature Granulocytes Absolute, Automated 0.03 0.00 - 0.70 x10*3/uL    Lymphocytes Absolute 1.72 1.20 - 4.80 x10*3/uL    Monocytes Absolute 0.62 0.10 - 1.00 x10*3/uL    Eosinophils Absolute 0.41 0.00 - 0.70 x10*3/uL    Basophils Absolute 0.05 0.00 - 0.10 x10*3/uL   Basic Metabolic Panel   Result Value Ref Range    Glucose 112 (H) 74 - 99 mg/dL    Sodium 141 136 - 145 mmol/L    Potassium 3.8 3.5 - 5.3 mmol/L    Chloride 107 98 - 107 mmol/L    Bicarbonate 28 21 - 32 mmol/L    Anion Gap 10 10 - 20 mmol/L    Urea Nitrogen 11 6 - 23 mg/dL    Creatinine 0.91 0.50 - 1.05 mg/dL    eGFR 69 >60 mL/min/1.73m*2    Calcium 8.4 (L) 8.6 - 10.3 mg/dL   Magnesium   Result Value Ref Range    Magnesium 2.08 1.60 - 2.40 mg/dL   Lipid Panel   Result Value Ref Range    Cholesterol 149 0 - 199 mg/dL    HDL-Cholesterol 31.9 mg/dL    Cholesterol/HDL Ratio 4.7     LDL Calculated 89 <=99 mg/dL    VLDL 28 0 - 40 mg/dL    Triglycerides 140 0 - 149 mg/dL    Non HDL Cholesterol 117 0 - 149 mg/dL        MR brain wo IV contrast    Result Date: 4/14/2025  Interpreted By:  Josseline Mendez, STUDY: MR BRAIN WO IV CONTRAST   INDICATION: Signs/Symptoms:TIA   COMPARISON: CT head 04/13/2025.   ACCESSION NUMBER(S): [id number]   ORDERING CLINICIAN: JUVE DEL TORO   TECHNIQUE: Multi-planar multi-sequential MR imaging of the brain was performed without intravenous contrast.   FINDINGS: VENTRICLES and EXTRA-AXIAL SPACES: No hydrocephalus.No abnormal extra-axial fluid collection. Basal cisterns are patent.   BRAIN PARENCHYMA:Mild periventricular/subcortical white matter areas of hypodensity are nonspecific but may be related to small vessel  ischemic disease.Small acute infarct in the left thalamus. Chronic infarct in the left cerebellum. No mass or mass effect. No midline shift.   PARANASAL SINUSES/MASTOIDS: Retention cyst in the left maxillary sinus, otherwise, the visualized paranasal sinuses are clear.The middle ears and mastoid air cells are clear.   OSSEOUS STRUCTURES: No suspicious osseous lesions.   OTHER: None.   IMPRESSION 1. Small acute infarct in the left thalamus. No evidence of hemorrhage.   I personally reviewed the images/study and I agree with the findings as stated.   MACRO: Critical Finding:  See findings. Notification was initiated on 4/14/2025 at 11:30 am by  Josseline Mendez via secure epic chat to Jose Mahoney and Basil. (**-[facility]-**)   Signed by: Josseline Mendez 4/14/2025 11:33 AM Dictation workstation:   [id number]    CT angio head w and wo IV contrast    Result Date: 4/13/2025  Interpreted By:  Mary Olsen, STUDY: CT ANGIO HEAD W AND WO IV CONTRAST;  4/13/2025 10:34 am   INDICATION: Signs/Symptoms:acute onset vertigo and double vision, now resolved but with nystagmus.   COMPARISON: Same day unenhanced head CT which is reported separately.   ACCESSION NUMBER(S): [id number]   ORDERING CLINICIAN: KEYSHAWN SNYDER   TECHNIQUE: 75 mL Omnipaque 350 was administered intravenously and axial images of the head were acquired.  Coronal, sagittal, and 3-D reconstructions were provided for review. 3D reconstructions were performed on an independent workstation but were not provided on PACS until approximately 12:11 p.m..   FINDINGS:     Anterior circulation: The bilateral intracranial internal carotid arteries, bilateral carotid terminals, bilateral proximal anterior and middle cerebral arteries are patent.   Posterior circulation: Bilateral intracranial vertebral arteries, vertebrobasilar junction, basilar artery and proximal posterior cerebral arteries are patent.         No evidence for significant stenosis or large branch vessel cutoffs of the  intracranial vessels. MRI with diffusion-imaging would be a more sensitive means of assessing for acute ischemic injury, particularly of the posterior fossa.     MACRO: None   Signed by: Mary Olsen 4/13/2025 12:11 PM Dictation workstation:   [id number]    CT head wo IV contrast    Result Date: 4/13/2025  Interpreted By:  Avani Mason, STUDY: CT HEAD WO IV CONTRAST;  4/13/2025 10:34 am   INDICATION: Signs/Symptoms:vertigo.   COMPARISON: None.   ACCESSION NUMBER(S): [id number]   ORDERING CLINICIAN: KEYSHAWN SNYDER   TECHNIQUE: CT axial images through the Brain were obtained without contrast.   All CT exams are performed with 1 or more of the following dose reduction techniques: Automatic exposure control, adjustment of mA and/or kv according to patient size, or use of iterative reconstruction techniques.   FINDINGS: There is no mass effect, hemorrhage, or infarct. The ventricles appear normal. Gray-white differentiation is maintained.   Mild paranasal sinus mucosal thickening noted.. The visualized portions of the orbits are unremarkable.       No acute intracranial abnormality.   MACRO: None.   Signed by: Avani Mason 4/13/2025 11:04 AM Dictation workstation:   [id number]    XR chest 2 views    Result Date: 4/13/2025  STUDY: Chest Radiographs;  4/13/2025 8:01 AM INDICATION: Cough. COMPARISON: None Available. ACCESSION NUMBER(S): [id number] ORDERING CLINICIAN: KEYSHAWN SNYDER TECHNIQUE:  Frontal and lateral chest. FINDINGS: CARDIOMEDIASTINAL SILHOUETTE: Cardiomediastinal silhouette is enlarged. Increased central pulmonary vascularity with cephalization. LUNGS: Left lung base and CP angle are obscured by cardiac silhouette. Left-sided small pleural effusion cannot be excluded. No large pulmonary consolidation, pleural effusion or pneumothorax within the visualized lungs. ABDOMEN: No remarkable upper abdominal findings.  BONES: No acute osseous changes.    Cardiomegaly with increased central pulmonary  vascularity with cephalization. Left lung base and CP angle are obscured by cardiac silhouette. Left-sided small pleural effusion cannot be excluded. No large pulmonary consolidation, pleural effusion or pneumothorax within the visualized lungs. Findings likely due to pulmonary congestion. Signed by Bran Guzman MD    XR chest 1 view    Result Date: 4/1/2025  * * *Final Report* * * DATE OF EXAM: Apr 1 2025  9:36PM   MMX   [id number]  -  XR CHEST 1V FRONTAL PORT  / ACCESSION #  [id number] PROCEDURE REASON: Fatigue and malaise      * * * * Physician Interpretation * * * *  EXAMINATION:  CHEST RADIOGRAPH (PORTABLE SINGLE VIEW AP) Exam Date/Time:  4/1/2025 9:36 PM CLINICAL HISTORY: Fatigue and malaise, Fatigue and malaise MQ:  XCPR_5 Comparison:  Chest clear with 13 2023 RESULT: Lines, tubes, and devices:  None. Lungs and pleura:  No consolidation.  No pleural effusion or pneumothorax. Cardiomediastinal silhouette:  Stable cardiomediastinal silhouette. Other:  No acute bony abnormality.    IMPRESSION: No acute radiographic abnormality. : PSCB   Transcribe Date/Time: Apr 1 2025 11:04P Dictated by : MORGAN TOBIAS MD This examination was interpreted and the report reviewed and electronically signed by: MORGAN TOBIAS MD on Apr 1 2025 11:07PM  EST    XR lumbar spine 2-3 views    Result Date: 3/28/2025  * * *Final Report* * * DATE OF EXAM: Mar 28 2025 11:12AM   CCX   [id number]  -  XR LUMBAR 3V AP/LAT/L5-S1  / ACCESSION #  [id number] PROCEDURE REASON: Sciatica of right side      * * * * Physician Interpretation * * * *  HISTORY:  Sciatica of right side TECHNOLOGIST PROVIDED HISTORY (if applicable): PT sts butt cheek pain on right side. No prev sx or injury TECHNIQUE: XR LUMBAR 3V AP/LAT/L5-S1 RESULT: Lumbar spine 3 views.   Counting reference:  Lumbosacral junction.  For the purposes of this report, L5-S1 is considered the most caudal well formed disc space. Detail limited by habitus and suspected osteopenia. Levoscoliosis is  centered at L3.  The lordosis is preserved.  Grade 1 degenerative anterolisthesis at L4-5 with likely greater retrolisthesis at L3-4.  Disc space narrowing and endplate sclerosis at L5-S1.  Facet arthropathy L4 caudal. Mild degenerative sclerosis at the RIGHT sacroiliac joint.  The LEFT SI joint and hips are grossly preserved.  The symphysis is not diagnostically visualized. 2 cm calcified Gallstone.  Vascular calcifications.    IMPRESSION: MILD LEVOSCOLIOSIS AND DEGENERATIVE CHANGES. : PSCB   Transcribe Date/Time: Mar 28 2025  2:30P Dictated by : MIMI RAUSCH MD This examination was interpreted and the report reviewed and electronically signed by: MIMI RAUSCH MD on Mar 28 2025  2:35PM  EST         Assessment/plan:    Natasha Hargrove is a 70 y.o. female with past medical history of atrial fibrillation status post ablation, chronic allergic rhinitis, degenerative joint disease, GERD, intertrigo, obesity, rosacea, hypovitaminosis D, hernia repair, hysterectomy, multiple hernia repairs presenting with chief complaint of dizziness.      Progress   4/14: MRI brain shows small acute L infarct in L thalamus. Clinically, patient has NIH of 0. Loaded with plavix, await echo. Will attempt to get sleep study records from [facility].     #Acute L thalamic stroke   #Obesity  #Recent upper respiratory infection  #Ambulatory dysfunction  #Likely postviral cough     - Neurology consult, telemetry monitoring, check A1c, morning cholesterol, obtain echo, obtain MRI brain (results above) , 21 days DAPT, high intensity statin therapy   -Check orthostatics, negative  -DVT prophylaxis with Lovenox  -Recheck viral swabs as patient had recent URI that did not improve much despite prolonged antibiotic course, negative     #History of atrial fibrillation status post ablation  #History of chronic allergic rhinitis  #History of asthma  #History of GERD  #History of degenerative joint disease  #History of obesity  #History  rosacea  #Status post multiple hernia repairs  #Suspectd HX ABDIRAHMAN     - Continue home flecainide, inhalers, Singulair  - Hold home Taylor Gracia DO

## 2025-04-14 NOTE — PROGRESS NOTES
04/14/25 164   Discharge Planning   Living Arrangements Spouse/significant other   Support Systems Children;Spouse/significant other;Family members   Assistance Needed None   Type of Residence Private residence   Home or Post Acute Services None   Expected Discharge Disposition Home   Does the patient need discharge transport arranged? No     TCC Note: Requested SW to please see this pt today as I was informed by the Diabetes Educator that this pt is having a hard time dealing with the pressures of not feeling well and because of her  having a Mental Illness of Borderline Personality. Also asked our  to please see this pt too.  acknowledged and will see as soon as available. Eun Becker, MSN, RN, TCC.

## 2025-04-14 NOTE — NURSING NOTE
04/14/25 0800 Patient Navigator   I introduced myself to the patient and explained my role. Pt states she lives with her  & has help from her daughters as needed. She did states she is experiencing some stress at home/work and was tearful at times. I have updated the patient's TCC, Tanja regarding my concerns- she was going to follow through with this.  She states she is currently doing PT every other week outpatient for sciatica pain. I did inform her of the recommendations to exercise 30 minutes 5 days a week per the AHA. She states she attempts to eat a variety of healthy foods and was following with [employer].   We discussed the handouts listed below, the following lab values, and what they indicate: cholesterol, HDL, LDL, triglycerides, and A1C. She states she did the sleep apnea study a couple years ago and was told the results were lost. She hasn't followed through since then. I did explain to her the risk of stroke and sleep apnea.   I gave her my business card and instructed her to contact me as needed. Please see the education tab for additional information. She appreciated my visit and verbalized understanding of the above note. I have updated the patient's RN of my visit.    Handouts:   Stroke booklet  What can I eat? American Diabetes Association  Planning healthy meals- Conch[employer] Nordisk  How can I improve my cholesterol? [place] [employer]  How do I follow a healthy diet pattern? American Heart Association     Tina LAW, RN  Patient Navigator  Stroke Educator  Diabetes Care &

## 2025-04-14 NOTE — PROGRESS NOTES
Occupational Therapy    Evaluation    Patient Name: Natasha Hargrove  MRN: [id number]  Department: [facility] 3  Room: [address] [place] [address]  Today's Date: 4/14/2025  Time Calculation  Start Time: 0940  Stop Time: 0955  Time Calculation (min): 15 min    Assessment  IP OT Assessment  OT Assessment: Pt. presents with a decline in self-care, mobility and safety and would benefit from skilled OT services to maximize independence and promote return to prior level of function.  Prognosis: Good  Barriers to Discharge Home: No anticipated barriers  End of Session Communication: Bedside nurse  End of Session Patient Position:  (pt. ambulated to wheelchair with transport to go for testing)  Plan:  Treatment Interventions: ADL retraining, Functional transfer training, Endurance training, Compensatory technique education  OT Frequency: 2 times per week  OT Discharge Recommendations: Low intensity level of continued care  OT Recommended Transfer Status: Assist of 1  OT - OK to Discharge: Yes (to next level of care when cleared by medical team)    Subjective   Current Problem:  1. Dizziness  Transthoracic Echo (TTE) Complete    Holter Or Event Cardiac Monitor    Transthoracic Echo (TTE) Complete    Holter Or Event Cardiac Monitor    CANCELED: Transthoracic Echo (TTE) Complete    CANCELED: Transthoracic Echo (TTE) Complete      2. Vertigo          General:  General  Reason for Referral: impaired adl; pt. admitted with dizziness, double vision, concern for tia vs vertigo, cxr: vascular congestion, ct head/neck:  unremarkable  Referred By: Oumar  Past Medical History Relevant to Rehab: gerd, a fib, s/p ablation, chronic allergic rhinitis, degenerative joint disease, intertrigo, obesity, rosacea, hernia repair, hysterectomy, asthma  Co-Treatment: PT  Co-Treatment Reason: to maximize patient safety/abilities  Prior to Session Communication: Bedside nurse  Patient Position Received: Bed, 2 rail up, Alarm off, not on at start of session  General Comment: pt.  agreeable to therapy intervention  Precautions:  Precautions Comment: tele     Date/Time Vitals Session Patient Position Pulse Resp SpO2 BP MAP (mmHg)    04/14/25 11:11:18 --  --  66  17  92 %  134/62  89                Pain:  Pain Assessment  Pain Assessment: 0-10  0-10 (Numeric) Pain Score:  (no pain complaints)    Objective   Cognition:  Orientation Level: Oriented X4           Home Living:  Home Living Comments: pt. lives with spouse, 1+3 entry steps with partial rail, basement laundry, stall shower with gb, built in seat, hhs, RTS, rollator, st. cane   Prior Function:  Prior Function Comments: pt. is independent with adl, use of st. cane or rollator for ambulation, sleeps in recliner on 1st floor, pt. is a teacher, drives, spouse completes all iadl tasks  IADL History:     ADL:  ADL Comments: pt. able to complete toileting/urinating, on/off toilet with distant supervision, able to manage pants up/down over hips, pt. wears JOHN hose, would estimate some assist presently however pt. is typically independent at baseline  Activity Tolerance:  Endurance: Endurance does not limit participation in activity  Bed Mobility/Transfers: Bed Mobility  Bed Mobility:  (supine to sit:  sba)    Transfers  Transfer:  (sit<> stand from eob:  cga)      Functional Mobility:  Functional Mobility  Functional Mobility Performed:  (mobility via st. cane completed with cga)  Sensation:  Sensation Comment: sensation intact  Strength:  Strength Comments: bue's at least 3/5 per observation  Coordination:  Movements are Fluid and Coordinated: Yes   Outcome Measures: [facility] Daily Activity  Putting on and taking off regular lower body clothing: A lot  Bathing (including washing, rinsing, drying): A little  Putting on and taking off regular upper body clothing: A little  Toileting, which includes using toilet, bedpan or urinal: A little  Taking care of personal grooming such as brushing teeth: None  Eating Meals: None  Daily Activity - Total  Score: 19      Education Documentation  ADL Training, taught by Eva Thompson OT at 4/14/2025 12:47 PM.  Learner: Patient  Readiness: Acceptance  Method: Explanation  Response: Verbalizes Understanding, Needs Reinforcement  Comment: adaptive equipment      Goals:   Encounter Problems       Encounter Problems (Active)       OT Goals       Increase functional mobility and  functional transfers to supervision for bed/chair/toilet with dme prn   (Progressing)       Start:  04/14/25    Expected End:  04/21/25            increase bue ther ex/activity x 7-10 minutes and increase standing tolerance x 3-5 minutes with supervision to promote greater activity tolerance for assist with adl.   (Progressing)       Start:  04/14/25    Expected End:  04/21/25            Increase lb dressing/bathing to supervision with dme prn  (Progressing)       Start:  04/14/25    Expected End:  04/21/25            pt. to apply ec/ws techniques with minimal cues to all mobility/transfer/adl to decrease fatigue/promote efficient use of energy toward completion of functional tasks.  (Progressing)       Start:  04/14/25    Expected End:  04/21/25

## 2025-04-15 ENCOUNTER — PHARMACY VISIT (OUTPATIENT)
Dept: PHARMACY | Facility: CLINIC | Age: 70
End: 2025-04-15
Payer: MEDICARE

## 2025-04-15 ENCOUNTER — APPOINTMENT (OUTPATIENT)
Dept: CARDIOLOGY | Facility: HOSPITAL | Age: 70
End: 2025-04-15
Payer: COMMERCIAL

## 2025-04-15 VITALS
HEART RATE: 62 BPM | BODY MASS INDEX: 51.38 KG/M2 | OXYGEN SATURATION: 94 % | SYSTOLIC BLOOD PRESSURE: 127 MMHG | HEIGHT: 63 IN | DIASTOLIC BLOOD PRESSURE: 56 MMHG | WEIGHT: 290 LBS | RESPIRATION RATE: 18 BRPM | TEMPERATURE: 97.9 F

## 2025-04-15 PROBLEM — I63.9 ACUTE CVA (CEREBROVASCULAR ACCIDENT) (MULTI): Status: ACTIVE | Noted: 2025-04-15

## 2025-04-15 LAB
ANION GAP SERPL CALC-SCNC: 11 MMOL/L (ref 10–20)
AORTIC VALVE MEAN GRADIENT: 6 MMHG
AORTIC VALVE PEAK VELOCITY: 1.6 M/S
AV PEAK GRADIENT: 10 MMHG
AVA (PEAK VEL): 1.66 CM2
AVA (VTI): 1.77 CM2
BASOPHILS # BLD AUTO: 0.06 X10*3/UL (ref 0–0.1)
BASOPHILS NFR BLD AUTO: 0.9 %
BODY SURFACE AREA: 2.42 M2
BUN SERPL-MCNC: 11 MG/DL (ref 6–23)
CALCIUM SERPL-MCNC: 8.4 MG/DL (ref 8.6–10.3)
CHLORIDE SERPL-SCNC: 108 MMOL/L (ref 98–107)
CO2 SERPL-SCNC: 26 MMOL/L (ref 21–32)
CREAT SERPL-MCNC: 0.95 MG/DL (ref 0.5–1.05)
EGFRCR SERPLBLD CKD-EPI 2021: 65 ML/MIN/1.73M*2
EJECTION FRACTION APICAL 4 CHAMBER: 63.6
EJECTION FRACTION: 58 %
EOSINOPHIL # BLD AUTO: 0.44 X10*3/UL (ref 0–0.7)
EOSINOPHIL NFR BLD AUTO: 6.3 %
ERYTHROCYTE [DISTWIDTH] IN BLOOD BY AUTOMATED COUNT: 14.7 % (ref 11.5–14.5)
GLUCOSE SERPL-MCNC: 107 MG/DL (ref 74–99)
HCT VFR BLD AUTO: 35.8 % (ref 36–46)
HGB BLD-MCNC: 11.4 G/DL (ref 12–16)
IMM GRANULOCYTES # BLD AUTO: 0.03 X10*3/UL (ref 0–0.7)
IMM GRANULOCYTES NFR BLD AUTO: 0.4 % (ref 0–0.9)
LEFT VENTRICLE INTERNAL DIMENSION DIASTOLE: 4.58 CM (ref 3.5–6)
LEFT VENTRICULAR OUTFLOW TRACT DIAMETER: 1.7 CM
LYMPHOCYTES # BLD AUTO: 1.68 X10*3/UL (ref 1.2–4.8)
LYMPHOCYTES NFR BLD AUTO: 23.9 %
MCH RBC QN AUTO: 27.5 PG (ref 26–34)
MCHC RBC AUTO-ENTMCNC: 31.8 G/DL (ref 32–36)
MCV RBC AUTO: 87 FL (ref 80–100)
MITRAL VALVE E/A RATIO: 1.06
MONOCYTES # BLD AUTO: 0.71 X10*3/UL (ref 0.1–1)
MONOCYTES NFR BLD AUTO: 10.1 %
NEUTROPHILS # BLD AUTO: 4.11 X10*3/UL (ref 1.2–7.7)
NEUTROPHILS NFR BLD AUTO: 58.4 %
NRBC BLD-RTO: 0 /100 WBCS (ref 0–0)
PLATELET # BLD AUTO: 271 X10*3/UL (ref 150–450)
POTASSIUM SERPL-SCNC: 3.6 MMOL/L (ref 3.5–5.3)
RBC # BLD AUTO: 4.14 X10*6/UL (ref 4–5.2)
RIGHT VENTRICLE FREE WALL PEAK S': 17.8 CM/S
RIGHT VENTRICLE PEAK SYSTOLIC PRESSURE: 48.2 MMHG
SODIUM SERPL-SCNC: 141 MMOL/L (ref 136–145)
TRICUSPID ANNULAR PLANE SYSTOLIC EXCURSION: 2.7 CM
WBC # BLD AUTO: 7 X10*3/UL (ref 4.4–11.3)

## 2025-04-15 PROCEDURE — 85025 COMPLETE CBC W/AUTO DIFF WBC: CPT

## 2025-04-15 PROCEDURE — 2500000004 HC RX 250 GENERAL PHARMACY W/ HCPCS (ALT 636 FOR OP/ED)

## 2025-04-15 PROCEDURE — 99239 HOSP IP/OBS DSCHRG MGMT >30: CPT | Performed by: STUDENT IN AN ORGANIZED HEALTH CARE EDUCATION/TRAINING PROGRAM

## 2025-04-15 PROCEDURE — 93246 EXT ECG>7D<15D RECORDING: CPT

## 2025-04-15 PROCEDURE — 80048 BASIC METABOLIC PNL TOTAL CA: CPT

## 2025-04-15 PROCEDURE — 94640 AIRWAY INHALATION TREATMENT: CPT

## 2025-04-15 PROCEDURE — 99232 SBSQ HOSP IP/OBS MODERATE 35: CPT | Performed by: NURSE PRACTITIONER

## 2025-04-15 PROCEDURE — 2500000002 HC RX 250 W HCPCS SELF ADMINISTERED DRUGS (ALT 637 FOR MEDICARE OP, ALT 636 FOR OP/ED)

## 2025-04-15 PROCEDURE — 36415 COLL VENOUS BLD VENIPUNCTURE: CPT

## 2025-04-15 PROCEDURE — 2500000001 HC RX 250 WO HCPCS SELF ADMINISTERED DRUGS (ALT 637 FOR MEDICARE OP): Performed by: STUDENT IN AN ORGANIZED HEALTH CARE EDUCATION/TRAINING PROGRAM

## 2025-04-15 PROCEDURE — 93247 EXT ECG>7D<15D SCAN A/R: CPT

## 2025-04-15 PROCEDURE — 2500000001 HC RX 250 WO HCPCS SELF ADMINISTERED DRUGS (ALT 637 FOR MEDICARE OP)

## 2025-04-15 PROCEDURE — 96372 THER/PROPH/DIAG INJ SC/IM: CPT

## 2025-04-15 PROCEDURE — G0378 HOSPITAL OBSERVATION PER HR: HCPCS

## 2025-04-15 PROCEDURE — RXMED WILLOW AMBULATORY MEDICATION CHARGE

## 2025-04-15 RX ORDER — MECLIZINE HYDROCHLORIDE 25 MG/1
25 TABLET ORAL 3 TIMES DAILY PRN
Qty: 30 TABLET | Refills: 0 | Status: SHIPPED | OUTPATIENT
Start: 2025-04-15

## 2025-04-15 RX ORDER — CLOPIDOGREL BISULFATE 75 MG/1
75 TABLET ORAL DAILY
Qty: 20 TABLET | Refills: 0 | Status: SHIPPED | OUTPATIENT
Start: 2025-04-16 | End: 2025-05-06

## 2025-04-15 RX ORDER — ASPIRIN 81 MG/1
81 TABLET ORAL DAILY
Qty: 30 TABLET | Refills: 0 | Status: SHIPPED | OUTPATIENT
Start: 2025-04-15 | End: 2025-05-15

## 2025-04-15 RX ORDER — POTASSIUM CHLORIDE 750 MG/1
20 TABLET, FILM COATED, EXTENDED RELEASE ORAL DAILY
Qty: 60 TABLET | Refills: 0 | Status: SHIPPED | OUTPATIENT
Start: 2025-04-15 | End: 2025-05-15

## 2025-04-15 RX ORDER — POTASSIUM CHLORIDE 20 MEQ/1
40 TABLET, EXTENDED RELEASE ORAL ONCE
Status: COMPLETED | OUTPATIENT
Start: 2025-04-15 | End: 2025-04-15

## 2025-04-15 RX ORDER — ALBUTEROL SULFATE 0.83 MG/ML
1.25 SOLUTION RESPIRATORY (INHALATION) EVERY 2 HOUR PRN
Status: DISCONTINUED | OUTPATIENT
Start: 2025-04-15 | End: 2025-04-15 | Stop reason: HOSPADM

## 2025-04-15 RX ORDER — ATORVASTATIN CALCIUM 40 MG/1
40 TABLET, FILM COATED ORAL NIGHTLY
Qty: 30 TABLET | Refills: 0 | Status: SHIPPED | OUTPATIENT
Start: 2025-04-15 | End: 2025-05-15

## 2025-04-15 RX ADMIN — BISMUTH SUBSALICYLATE 262 MG: 525 LIQUID ORAL at 09:25

## 2025-04-15 RX ADMIN — ACETAMINOPHEN 650 MG: 325 TABLET, FILM COATED ORAL at 00:44

## 2025-04-15 RX ADMIN — MOMETASONE FUROATE 2 PUFF: 220 INHALANT RESPIRATORY (INHALATION) at 07:15

## 2025-04-15 RX ADMIN — ASPIRIN 81 MG: 81 TABLET, COATED ORAL at 15:03

## 2025-04-15 RX ADMIN — FLECAINIDE ACETATE 100 MG: 100 TABLET ORAL at 09:25

## 2025-04-15 RX ADMIN — FUROSEMIDE 40 MG: 40 TABLET ORAL at 08:34

## 2025-04-15 RX ADMIN — MONTELUKAST 10 MG: 10 TABLET, FILM COATED ORAL at 08:34

## 2025-04-15 RX ADMIN — CLOPIDOGREL BISULFATE 75 MG: 75 TABLET, FILM COATED ORAL at 08:34

## 2025-04-15 RX ADMIN — POTASSIUM CHLORIDE 40 MEQ: 1500 TABLET, EXTENDED RELEASE ORAL at 09:25

## 2025-04-15 RX ADMIN — ENOXAPARIN SODIUM 60 MG: 60 INJECTION SUBCUTANEOUS at 08:34

## 2025-04-15 ASSESSMENT — COGNITIVE AND FUNCTIONAL STATUS - GENERAL
CLIMB 3 TO 5 STEPS WITH RAILING: A LITTLE
WALKING IN HOSPITAL ROOM: A LITTLE
DAILY ACTIVITIY SCORE: 24
MOBILITY SCORE: 22

## 2025-04-15 ASSESSMENT — PAIN SCALES - GENERAL
PAINLEVEL_OUTOF10: 0 - NO PAIN
PAINLEVEL_OUTOF10: 0 - NO PAIN

## 2025-04-15 NOTE — CARE PLAN
The patient's goals for the shift include      The clinical goals for the shift include Pt will remain safe and free from any stroke symptoms    Over the shift, the patient did not make progress toward the following goals. Barriers to progression include acute illness. Recommendations to address these barriers include communication.

## 2025-04-15 NOTE — DISCHARGE INSTRUCTIONS
- It is recommended for you to wear a 14-day continuous cardiac event monitor at discharge to rule out any evidence of sustained atrial fibrillation that could have attributed to acute CVA.

## 2025-04-15 NOTE — PROGRESS NOTES
04/15/25 1259   Discharge Planning   Assistance Needed SW called and spoke with pt to discuss mental health supports. Discussed referrals to psychologist, [facility] family support groups and [phone]. SW placed resources on pt discharge paperwork.

## 2025-04-15 NOTE — PROGRESS NOTES
"Natasha Hargrove is a 70 y.o. female on day 0 of admission presenting with Dizziness.      Subjective   Echocardiogram completed with ejection fraction of 60 to 65% and a negative bubble study.  No atrial fibrillation was identified during time of testing.  Given that patient has not had any evidence of A-fib during this hospitalization, we will continue to recommend dual antiplatelet therapy for CVA prophylaxis at discharge and for her to wear 14-day continuous cardiac event monitor.  She has to follow-up with both neurology and cardiology in the outpatient setting, and if the event monitor is significant for any atrial fibrillation, it is recommended for her to transition to anticoagulant at that time.  Neurology to sign upon care.     Objective     Last Recorded Vitals  Blood pressure 127/56, pulse 62, temperature 36.6 °C (97.9 °F), resp. rate 18, height 1.6 m (5' 3\"), weight 132 kg (290 lb), SpO2 94%.    Physical exam/neurological exam  Patient seen and examined at this time; upon entering room she is resting quietly in bed side chair with her  present. Appears fully developed and well nourished.  Obese.  Mental status: A&Ox 3. Memory testing, fund of knowledge and concentration within normal limits. Speech is fluent and negative for any paraphrasic errors.  Patient is very anxious and is terrified she will have another stroke.  Extensive education has been provided regarding elevated risk over the next 3 months and attempting to gain tight control over comorbid conditions that predispose her risk, in addition to attempting to lose weight to assist with blood pressure.    Cranial Nerves:  Optic II/ Oculomotor III: Fundoscopic exam was technically difficult. PERRL +2. Visual fields are full. Convergence and accomodation noted without difficulty. Negative for deficits to visual acuity confrontation via static-finger wiggle test. Eyes appear aligned and free of exophthalmos and ptosis. Sclera are white " bilaterally and lens are free from clouding.   Oculomotor III/ Trochlear IV/ Abducens VI: Extraocular movements are full, with no evidence of nystagmus. Negative for diplopia.   Trigeminal V: Facial sensation is intact to light touch. Corneal reflex responsive when threatened bilaterally.  Facial VII: intact; nose is midline, with no evidence of flattening to nasolabial folds noted and mouth is negative for evidence of droop. Patient is successfully able to follow commands to raise eyebrows, squeeze eyes shut, smile and show teeth, frown, and puff out cheeks.   Acoustic VIII: Hearing is intact bilaterally.  Glossopharngyeal IX/ Vagus X: Palate elevates symmetrically to phonation. Findings are negative for uvula deviation or dysphagia.   Spinal accessory XI: Sternocleidomastoid/ upper trapezius is 5/5 to strength testing. No asymmetry noted to strength, bulk, or tone.   Hypoglossal XII: Tongue is midline and without deviations. Phonation is articulate and is negative for findings of dysarthria or aphasia.     Motor exam: negative for evidence of involuntary movements or fasiculations. BUE flexion of biceps and brachioradialis graded 5/5, in addition to extension of triceps at elbow and wrists. BUE  strength 5/5, along with finger abduction and thumb opposition. BLE hip flexion, extension, adduction, and abduction 4-/5. Knee extension & flexion 4-/5. Ankle dorsiflexion and plantarflexion 5/5. Normal bulk and normal tone.  Experiencing sciatic pain.    Sensory exam: Sensation is intact to light touch throughout.    Reflexes: Reflexes are 1+ and symmetric. Bilateral plantar responses are flexor. Ankle jerks symmetric.     Coordination: finger-to-nose testing is negative for signs of dysmetria. Pronator drift testing to BUE negative. Rapid alternating hand movements WNL.    Gait exam: negative for ataxia.    Relevant Results  Scheduled medications  aspirin, 81 mg, oral, Daily  atorvastatin, 40 mg, oral,  Nightly  clopidogrel, 75 mg, oral, Daily  enoxaparin, 60 mg, subcutaneous, q12h MT  flecainide, 100 mg, oral, q12h  furosemide, 40 mg, oral, Daily  mometasone, 2 puff, inhalation, BID  montelukast, 10 mg, oral, Daily      Continuous medications     PRN medications  PRN medications: acetaminophen **OR** acetaminophen **OR** acetaminophen, acetaminophen **OR** acetaminophen **OR** acetaminophen, albuterol, bismuth subsalicylate, hydrocortisone, meclizine, methocarbamol, ondansetron, oxygen  Transthoracic Echo (TTE) Complete    Result Date: 4/15/2025   [facility], [address] [place] [address]           Tel [phone] and Fax [phone] TRANSTHORACIC ECHOCARDIOGRAM REPORT  Patient Name:       KIKI CLARADEREKLEAH Hurd Physician:    [id number] Gaytan MD Study Date:         4/14/2025           Ordering Provider:    [id number] DEL TORO MRN/PID:            [id number]            Fellow: Accession#:         [id number]        Nurse: Date of Birth/Age:  1955 / 70      Sonographer:          Noel geiger RDCS Gender assigned at  F                   Additional Staff: Birth: Height:             160.00 cm           Admit Date:           4/13/2025 Weight:             132.00 kg           Admission Status:     Inpatient -                                                               Routine BSA / BMI:          2.27 m2 / 51.56     Encounter#:           [id number]                     kg/m2 Blood Pressure:     141/65 mmHg         Department Location:  [facility] Study Type:    TRANSTHORACIC ECHO (TTE) COMPLETE Diagnosis/ICD: Dizziness and giddiness-R42 Indication:    Transischemic Attack CPT Code:      Echo Complete w Full Doppler-[id number] Patient History: Pertinent History: TIA. Study Detail: The following Echo  studies were performed: 2D, M-Mode, Doppler and               color flow. Technically challenging study due to poor acoustic               windows. Agitated saline used as a contrast agent for intraseptal               flow evaluation.  PHYSICIAN INTERPRETATION: Left Ventricle: Left ventricular ejection fraction is normal, by visual estimate at 55-60%. There is mild concentric left ventricular hypertrophy. There are no regional left ventricular wall motion abnormalities. The left ventricular cavity size is normal. There is moderately increased septal and moderately increased posterior left ventricular wall thickness. Spectral Doppler shows a Grade II (pseudonormal pattern) of left ventricular diastolic filling with an elevated left atrial pressure. Left Atrium: The left atrial size is normal. There is no evidence of a patent foramen ovale. A bubble study using agitated saline was performed. Bubble study is negative. Right Ventricle: The right ventricle is normal in size. There is normal right ventricular global systolic function. Right Atrium: The right atrium is normal in size. Aortic Valve: The aortic valve is trileaflet. There is mild aortic valve thickening. There is evidence of mildly elevated transaortic gradients consistent with sclerosis of the aortic valve. The aortic valve dimensionless index is 0.78. There is no evidence of aortic valve regurgitation. The peak instantaneous gradient of the aortic valve is 10 mmHg. The mean gradient of the aortic valve is 6 mmHg. Mitral Valve: The mitral valve is normal in structure. There is mild mitral valve regurgitation. Tricuspid Valve: The tricuspid valve is structurally normal. There is mild tricuspid regurgitation. The Doppler estimated RVSP is moderate to severely elevated at 48.2 mmHg. Pulmonic Valve: The pulmonic valve is structurally normal. There is trace pulmonic valve regurgitation. Pericardium: There is no pericardial effusion noted. Aorta: The aortic root  is normal.  CONCLUSIONS:  1. Left ventricular ejection fraction is normal, by visual estimate at 55-60%.  2. Spectral Doppler shows a Grade II (pseudonormal pattern) of left ventricular diastolic filling with an elevated left atrial pressure.  3. There is moderately increased septal and moderately increased posterior left ventricular wall thickness.  4. There is normal right ventricular global systolic function.  5. Moderate to severely elevated right ventricular systolic pressure.  6. Aortic valve sclerosis.  7. There is no evidence of a patent foramen ovale. QUANTITATIVE DATA SUMMARY:  2D MEASUREMENTS:          Normal Ranges: IVSd:            1.27 cm  (0.6-1.1cm) LVPWd:           1.26 cm  (0.6-1.1cm) LVIDd:           4.58 cm  (3.9-5.9cm) LVIDs:           2.60 cm LV Mass Index:   97 g/m2 LVEDV Index:     50 ml/m2 LV % FS          43.2 %  AORTA MEASUREMENTS:         Normal Ranges: Asc Ao, d:          3.30 cm (2.1-3.4cm)  LV SYSTOLIC FUNCTION:                      Normal Ranges: EF-A4C View:    64 % (>=55%) EF-A2C View:    57 % EF-Biplane:     58 % EF-Visual:      58 % LV EF Reported: 58 %  LV DIASTOLIC FUNCTION:           Normal Ranges: MV Peak E:             1.27 m/s  (0.7-1.2 m/s) MV Peak A:             1.20 m/s  (0.42-0.7 m/s) E/A Ratio:             1.06      (1.0-2.2) MV e'                  0.135 m/s (>8.0) MV lateral e'          0.14 m/s MV medial e'           0.12 m/s E/e' Ratio:            9.41      (<8.0)  MITRAL VALVE:          Normal Ranges: MV DT:        166 msec (150-240msec)  AORTIC VALVE:                      Normal Ranges: AoV Vmax:                1.60 m/s  (<=1.7m/s) AoV Peak PG:             10.2 mmHg (<20mmHg) AoV Mean P.0 mmHg  (1.7-11.5mmHg) LVOT Max Santo:            1.17 m/s  (<=1.1m/s) AoV VTI:                 32.10 cm  (18-25cm) LVOT VTI:                25.00 cm LVOT Diameter:           1.70 cm   (1.8-2.4cm) AoV Area, VTI:           1.77 cm2  (2.5-5.5cm2) AoV Area,Vmax:            1.66 cm2  (2.5-4.5cm2) AoV Dimensionless Index: 0.78  RIGHT VENTRICLE: RV Basal 3.28 cm RV Mid   2.61 cm RV Major 7.0 cm TAPSE:   27.2 mm RV s'    0.18 m/s  TRICUSPID VALVE/RVSP:          Normal Ranges: Peak TR Velocity:     3.36 m/s RV Syst Pressure:     48 mmHg  (< 30mmHg)  PULMONIC VALVE:          Normal Ranges: PV Max Santo:     1.0 m/s  (0.6-0.9m/s) PV Max PG:      3.9 mmHg PV Mean P.0 mmHg PV VTI:         17.90 cm  [id number] Edmundo Gaytan MD Electronically signed on 4/15/2025 at 8:13:08 AM  ** Final **     MR brain wo IV contrast    Result Date: 2025  Interpreted By:  Josseline Mendez, STUDY: MR BRAIN WO IV CONTRAST   INDICATION: Signs/Symptoms:TIA   COMPARISON: CT head 2025.   ACCESSION NUMBER(S): [id number]   ORDERING CLINICIAN: JUVE DEL TORO   TECHNIQUE: Multi-planar multi-sequential MR imaging of the brain was performed without intravenous contrast.   FINDINGS: VENTRICLES and EXTRA-AXIAL SPACES: No hydrocephalus.No abnormal extra-axial fluid collection. Basal cisterns are patent.   BRAIN PARENCHYMA:Mild periventricular/subcortical white matter areas of hypodensity are nonspecific but may be related to small vessel ischemic disease.Small acute infarct in the left thalamus. Chronic infarct in the left cerebellum. No mass or mass effect. No midline shift.   PARANASAL SINUSES/MASTOIDS: Retention cyst in the left maxillary sinus, otherwise, the visualized paranasal sinuses are clear.The middle ears and mastoid air cells are clear.   OSSEOUS STRUCTURES: No suspicious osseous lesions.   OTHER: None.   IMPRESSION 1. Small acute infarct in the left thalamus. No evidence of hemorrhage.   I personally reviewed the images/study and I agree with the findings as stated.   MACRO: Critical Finding:  See findings. Notification was initiated on 2025 at 11:30 am by  Josseline Mendez via secure epic chat to Jose Del Toro and Basil. (**-OCF-**)   Signed by: Josseline Mendez 2025 11:33 AM Dictation workstation:    [id number]    CT angio head w and wo IV contrast    Result Date: 4/13/2025  Interpreted By:  Mary Olsen, STUDY: CT ANGIO HEAD W AND WO IV CONTRAST;  4/13/2025 10:34 am   INDICATION: Signs/Symptoms:acute onset vertigo and double vision, now resolved but with nystagmus.   COMPARISON: Same day unenhanced head CT which is reported separately.   ACCESSION NUMBER(S): [id number]   ORDERING CLINICIAN: KEYSHAWN SNYDER   TECHNIQUE: 75 mL Omnipaque 350 was administered intravenously and axial images of the head were acquired.  Coronal, sagittal, and 3-D reconstructions were provided for review. 3D reconstructions were performed on an independent workstation but were not provided on PACS until approximately 12:11 p.m..   FINDINGS:     Anterior circulation: The bilateral intracranial internal carotid arteries, bilateral carotid terminals, bilateral proximal anterior and middle cerebral arteries are patent.   Posterior circulation: Bilateral intracranial vertebral arteries, vertebrobasilar junction, basilar artery and proximal posterior cerebral arteries are patent.         No evidence for significant stenosis or large branch vessel cutoffs of the intracranial vessels. MRI with diffusion-imaging would be a more sensitive means of assessing for acute ischemic injury, particularly of the posterior fossa.     MACRO: None   Signed by: Mary Olsen 4/13/2025 12:11 PM Dictation workstation:   [id number]    CT head wo IV contrast    Result Date: 4/13/2025  Interpreted By:  Avani Mason, STUDY: CT HEAD WO IV CONTRAST;  4/13/2025 10:34 am   INDICATION: Signs/Symptoms:vertigo.   COMPARISON: None.   ACCESSION NUMBER(S): [id number]   ORDERING CLINICIAN: KEYSHAWN SNYDER   TECHNIQUE: CT axial images through the Brain were obtained without contrast.   All CT exams are performed with 1 or more of the following dose reduction techniques: Automatic exposure control, adjustment of mA and/or kv according to patient size, or use of  iterative reconstruction techniques.   FINDINGS: There is no mass effect, hemorrhage, or infarct. The ventricles appear normal. Gray-white differentiation is maintained.   Mild paranasal sinus mucosal thickening noted.. The visualized portions of the orbits are unremarkable.       No acute intracranial abnormality.   MACRO: None.   Signed by: Avani Mason 4/13/2025 11:04 AM Dictation workstation:   [id number]    XR chest 2 views    Result Date: 4/13/2025  STUDY: Chest Radiographs;  4/13/2025 8:01 AM INDICATION: Cough. COMPARISON: None Available. ACCESSION NUMBER(S): [id number] ORDERING CLINICIAN: KEYSHAWN SNYDER TECHNIQUE:  Frontal and lateral chest. FINDINGS: CARDIOMEDIASTINAL SILHOUETTE: Cardiomediastinal silhouette is enlarged. Increased central pulmonary vascularity with cephalization. LUNGS: Left lung base and CP angle are obscured by cardiac silhouette. Left-sided small pleural effusion cannot be excluded. No large pulmonary consolidation, pleural effusion or pneumothorax within the visualized lungs. ABDOMEN: No remarkable upper abdominal findings.  BONES: No acute osseous changes.    Cardiomegaly with increased central pulmonary vascularity with cephalization. Left lung base and CP angle are obscured by cardiac silhouette. Left-sided small pleural effusion cannot be excluded. No large pulmonary consolidation, pleural effusion or pneumothorax within the visualized lungs. Findings likely due to pulmonary congestion. Signed by Bran Guzman MD    XR chest 1 view    Result Date: 4/1/2025  * * *Final Report* * * DATE OF EXAM: Apr 1 2025  9:36PM   MMX   [id number]  -  XR CHEST 1V FRONTAL PORT  / ACCESSION #  [id number] PROCEDURE REASON: Fatigue and malaise      * * * * Physician Interpretation * * * *  EXAMINATION:  CHEST RADIOGRAPH (PORTABLE SINGLE VIEW AP) Exam Date/Time:  4/1/2025 9:36 PM CLINICAL HISTORY: Fatigue and malaise, Fatigue and malaise MQ:  XCPR_5 Comparison:  Chest clear with 13 2023 RESULT: Lines,  tubes, and devices:  None. Lungs and pleura:  No consolidation.  No pleural effusion or pneumothorax. Cardiomediastinal silhouette:  Stable cardiomediastinal silhouette. Other:  No acute bony abnormality.    IMPRESSION: No acute radiographic abnormality. : SIDNEY   Transcribe Date/Time: Apr 1 2025 11:04P Dictated by : MORGAN TOBIAS MD This examination was interpreted and the report reviewed and electronically signed by: MORGAN TOBIAS MD on Apr 1 2025 11:07PM  EST    XR lumbar spine 2-3 views    Result Date: 3/28/2025  * * *Final Report* * * DATE OF EXAM: Mar 28 2025 11:12AM   CCX   [id number]  -  XR LUMBAR 3V AP/LAT/L5-S1  / ACCESSION #  [id number] PROCEDURE REASON: Sciatica of right side      * * * * Physician Interpretation * * * *  HISTORY:  Sciatica of right side TECHNOLOGIST PROVIDED HISTORY (if applicable): PT sts butt cheek pain on right side. No prev sx or injury TECHNIQUE: XR LUMBAR 3V AP/LAT/L5-S1 RESULT: Lumbar spine 3 views.   Counting reference:  Lumbosacral junction.  For the purposes of this report, L5-S1 is considered the most caudal well formed disc space. Detail limited by habitus and suspected osteopenia. Levoscoliosis is centered at L3.  The lordosis is preserved.  Grade 1 degenerative anterolisthesis at L4-5 with likely greater retrolisthesis at L3-4.  Disc space narrowing and endplate sclerosis at L5-S1.  Facet arthropathy L4 caudal. Mild degenerative sclerosis at the RIGHT sacroiliac joint.  The LEFT SI joint and hips are grossly preserved.  The symphysis is not diagnostically visualized. 2 cm calcified Gallstone.  Vascular calcifications.    IMPRESSION: MILD LEVOSCOLIOSIS AND DEGENERATIVE CHANGES. : [facility]   Transcribe Date/Time: Mar 28 2025  2:30P Dictated by : MIMI RAUSCH MD This examination was interpreted and the report reviewed and electronically signed by: MIMI RAUSCH MD on Mar 28 2025  2:35PM  EST   Results for orders placed or performed during the hospital encounter  of 04/13/25 (from the past 24 hours)   Transthoracic Echo (TTE) Complete   Result Value Ref Range    AV pk tanisha 1.60 m/s    AV mn grad 6 mmHg    LVOT diam 1.70 cm    MV E/A ratio 1.06     Tricuspid annular plane systolic excursion 2.7 cm    LV EF 58 %    RV free wall pk S' 17.80 cm/s    LVIDd 4.58 cm    RVSP 48.2 mmHg    Aortic Valve Area by Continuity of VTI 1.77 cm2    Aortic Valve Area by Continuity of Peak Velocity 1.66 cm2    AV pk grad 10 mmHg    LV A4C EF 63.6    CBC and Auto Differential   Result Value Ref Range    WBC 7.0 4.4 - 11.3 x10*3/uL    nRBC 0.0 0.0 - 0.0 /100 WBCs    RBC 4.14 4.00 - 5.20 x10*6/uL    Hemoglobin 11.4 (L) 12.0 - 16.0 g/dL    Hematocrit 35.8 (L) 36.0 - 46.0 %    MCV 87 80 - 100 fL    MCH 27.5 26.0 - 34.0 pg    MCHC 31.8 (L) 32.0 - 36.0 g/dL    RDW 14.7 (H) 11.5 - 14.5 %    Platelets 271 150 - 450 x10*3/uL    Neutrophils % 58.4 40.0 - 80.0 %    Immature Granulocytes %, Automated 0.4 0.0 - 0.9 %    Lymphocytes % 23.9 13.0 - 44.0 %    Monocytes % 10.1 2.0 - 10.0 %    Eosinophils % 6.3 0.0 - 6.0 %    Basophils % 0.9 0.0 - 2.0 %    Neutrophils Absolute 4.11 1.20 - 7.70 x10*3/uL    Immature Granulocytes Absolute, Automated 0.03 0.00 - 0.70 x10*3/uL    Lymphocytes Absolute 1.68 1.20 - 4.80 x10*3/uL    Monocytes Absolute 0.71 0.10 - 1.00 x10*3/uL    Eosinophils Absolute 0.44 0.00 - 0.70 x10*3/uL    Basophils Absolute 0.06 0.00 - 0.10 x10*3/uL   Basic Metabolic Panel   Result Value Ref Range    Glucose 107 (H) 74 - 99 mg/dL    Sodium 141 136 - 145 mmol/L    Potassium 3.6 3.5 - 5.3 mmol/L    Chloride 108 (H) 98 - 107 mmol/L    Bicarbonate 26 21 - 32 mmol/L    Anion Gap 11 10 - 20 mmol/L    Urea Nitrogen 11 6 - 23 mg/dL    Creatinine 0.95 0.50 - 1.05 mg/dL    eGFR 65 >60 mL/min/1.73m*2    Calcium 8.4 (L) 8.6 - 10.3 mg/dL   Holter Or Event Cardiac Monitor   Result Value Ref Range    BSA 2.42 m2             NIH Stroke Scale  1A. Level of Consciousness: Alert, Keenly Responsive  1B. Ask Month and  Age: Both Questions Right  1C. Blink Eyes & Squeeze Hands: Performs Both Tasks  2. Best Gaze: Normal  3. Visual: No Visual Loss  4. Facial Palsy: Normal Symmetrical Movements  5A. Motor - Left Arm: No Drift  5B. Motor - Right Arm: No Drift  6A. Motor - Left Leg: No Drift  6B. Motor - Right Leg: No Drift  7. Limb Ataxia: Absent  8. Sensory Loss: Normal  9. Best Language: No Aphasia  10. Dysarthria: Normal  11. Extinction and Inattention: No Abnormality  NIH Stroke Scale: 0           [place] Coma Scale  Best Eye Response: Spontaneous  Best Verbal Response: Oriented  Best Motor Response: Follows commands  Emily Coma Scale Score: 15                             Assessment/Plan      Assessment & Plan  Dizziness    Acute CVA (cerebrovascular accident) (Multi)    -It is noted that patient does have a history of atrial fibrillation status post ablation in the past, and has been negative for any evidence of atrial fibrillation during this hospitalization.  Due to this information, patient is not a candidate to initiate anticoagulation at this time for CVA prophylaxis.  Instead, it is recommended for patient to continue DAPT x 21 days for CVA prophylaxis.  After 21-day duration, patient to discontinue Plavix 75 mg p.o. daily and continue ASA 81 mg p.o. daily as monotherapy.  It is also recommended for patient to continue atorvastatin 40 mg for additional prophylaxis.  -Patient to wear 14-day continuous cardiac event monitor at discharge to determine whether or not she does experience sustained atrial fibrillation that may be asymptomatic and could have attributed to acute CVA.  If evidence is significant for this suspicion, it is recommended for patient to transition from antiplatelet medication to anticoagulant medication.  She has to follow-up with her cardiologist in 1 month to go over test results and transition medications if warranted.  -Recommendations for needs during hospitalization and at discharge via PT, OT, and  SLP  -Continue promotion of lifestyle modifications, such as: Strict BP and glycemic control, dietary habit changes, incorporation of daily exercise regimen, adherence to all prescription/OTC medication schedules, attendance to all follow-up appointments, cessation from smoking if applicable, abstinence from alcohol and illicit drug use if applicable  -Patient to follow-up with PCP in 1 to 2 weeks post-discharge  -Patient to follow-up with Dr. Tiff Hardy in 3 to 4 months post-discharge    Neurology to sign off at this time. Thank you for the opportunity to be a part of this patient's multidisciplinary treatment team.  If any additional questions or concerns arise, please do not hesitate to contact me or the on-call neurologist via [employer] Secure Chat.       I spent 30 minutes in the professional and overall care of this patient.      Minda Chavis, APRN-CNP

## 2025-04-15 NOTE — CARE PLAN
The patient's goals for the shift include  Rest     The clinical goals for the shift include Watch patient for any s/s of stroke activity    Problem: Pain - Adult  Goal: Verbalizes/displays adequate comfort level or baseline comfort level  Outcome: Progressing     Problem: Safety - Adult  Goal: Free from fall injury  Outcome: Progressing     Problem: Fall/Injury  Goal: Not fall by end of shift  Outcome: Progressing  Goal: Be free from injury by end of the shift  Outcome: Progressing  Goal: Use assistive devices by end of the shift  Outcome: Progressing

## 2025-04-15 NOTE — NURSING NOTE
Met with patient at the bedside. Discharge Planning discussed. AVS updated with follow-ups, education, and medication information. Verified/ entered a PCP and pharmacy. New medications and side effects discussed. Discussed follow up appointments. Patient had many questions, spent a long time re-discussing stroke symptoms and information in discharge packet. All questions answered.  Updated nurse on this info. AVS printed and hi-lighted. IV and tele removed.

## 2025-04-15 NOTE — DISCHARGE INSTR - APPOINTMENTS
988- Text or call National Crisis/ Suicide Hotline 24/7    [facility] Family Support Groups calendar- [email or web address]    [facility]  [place], [place]  [address], [address]  [place] [address]  Phone: [phone]    *[facility] refers people to [phone] as their Copeline number

## 2025-04-15 NOTE — DISCHARGE SUMMARY
Discharge Diagnosis  #Acute L thalamic stroke   #Obesity  #Recent upper respiratory infection  #Ambulatory dysfunction  #Likely postviral cough  #History of atrial fibrillation status post ablation  #History of chronic allergic rhinitis  #History of asthma  #History of GERD  #History of degenerative joint disease  #History of obesity  #History rosacea  #Status post multiple hernia repairs  #Suspectd HX ABDIRAHMAN    Issues Requiring Follow-Up  1.  Follow-up with your primary care doctor 1 to 2 weeks.    2.  Wear Holter monitor.    3.  Follow-up with your cardiologist after you have completed the Holter monitoring process.  4.  You will be on aspirin and Plavix for total of 21 days.  You received a dose of Plavix in the hospital, therefore you will be given 20 doses to go home with.  After a total of 21 days, you will then stop taking Plavix and continue taking aspirin indefinitely.   5. You will be on atorvastatin indefinitely. Take this every day.   6. Take meclizine as needed for dizziness.     Discharge Meds     Medication List      START taking these medications     aspirin 81 mg EC tablet; Take 1 tablet (81 mg) by mouth once daily.   atorvastatin 40 mg tablet; Commonly known as: Lipitor; Take 1 tablet (40   mg) by mouth once daily at bedtime.   clopidogrel 75 mg tablet; Commonly known as: Plavix; Take 1 tablet (75   mg) by mouth once daily for 20 doses.; Start taking on: April 16, 2025   meclizine 25 mg tablet; Commonly known as: Antivert; Take 1 tablet (25   mg) by mouth 3 times a day as needed for dizziness.     CONTINUE taking these medications     acetaminophen 500 mg tablet; Commonly known as: Tylenol   * Asmanex Twisthaler 220 mcg/ actuation (120) inhaler; Generic drug:   mometasone   * mometasone 50 mcg/actuation nasal spray; Commonly known as: Nasonex   bismuth subsalicylate 262 mg/15 mL suspension; Commonly known as: Pepto   Bismol   ciclopirox 8 % solution; Commonly known as: Penlac; Apply thin layer   once  daily to toenails.  Remove once weekly using alcohol.   flecainide 100 mg tablet; Commonly known as: Tambocor   furosemide 40 mg tablet; Commonly known as: Lasix   hydrocortisone 2.5 % rectal cream; Commonly known as: Anusol-HC   levalbuterol 1.25 mg/3 mL nebulizer solution; Commonly known as: Xopenex   levalbuterol 45 mcg/actuation inhaler; Commonly known as: Xopenex   methocarbamol 500 mg tablet; Commonly known as: Robaxin   montelukast 10 mg tablet; Commonly known as: Singulair   potassium chloride CR 10 mEq ER tablet; Commonly known as: Klor-Con;   Take 2 tablets (20 mEq) by mouth once daily.   Probiotic (B. coagulans) 1 billion cell tablet,chewable; Generic drug:   Bacillus coagulans  * This list has 2 medication(s) that are the same as other medications   prescribed for you. Read the directions carefully, and ask your doctor or   other care provider to review them with you.     STOP taking these medications     allopurinol 100 mg tablet; Commonly known as: Zyloprim   diazePAM 2 mg tablet; Commonly known as: Valium   estrogens (conjugated) vaginal cream; Commonly known as: Premarin   ketoconazole 2 % cream; Commonly known as: NIZOral   nystatin 100,000 unit/gram powder; Commonly known as: Mycostatin   predniSONE 20 mg tablet; Commonly known as: Deltasone     ASK your doctor about these medications     mupirocin 2 % ointment; Commonly known as: Bactroban       Test Results Pending At Discharge  Pending Labs       No current pending labs.            Hospital Course  Natasha Hargrove is a 70 y.o. female with past medical history of atrial fibrillation status post ablation, chronic allergic rhinitis, degenerative joint disease, GERD, intertrigo, obesity, rosacea, hypovitaminosis D, hernia repair, hysterectomy, multiple hernia repairs presenting with chief complaint of dizziness.  Earlier today the patient had dizziness, she was sitting on the toilet and moved her head and felt as though the room began spinning around  her and she had double vision at this time.  At the time of my assessment in the ED her double vision is totally resolved.  She does endorse dry cough which she has had for the past month or so, she recently completed 10-day course of Augmentin and was seen in the emergency department at [facility] on April 1 for feeling rundown where she was tested for flu and coronavirus and these were negative and she was subsequently sent home.  Currently she is resting in the ED hemodynamically stable.  Her chest x-ray does demonstrate some vascular congestion.  CT imaging of her head and neck vessels was unremarkable, there is no acute process going on in her brain at this time.  Labs are mostly unremarkable as well, EKG demonstrated sinus rhythm in the ED. MRI revealed acute L thalamic stroke, patient seen by neuro, recommended 21 days of DAPT. Echo negative for PFO. Tele monitoring negative for afib. Will be discharged with holter monitor and PCP and cardiology follow up.     Pertinent Physical Exam At Time of Discharge  Physical Exam  Constitutional:       Appearance: She is obese. She is not ill-appearing.   HENT:      Head: Normocephalic and atraumatic.   Eyes:      Extraocular Movements: Extraocular movements intact.      Pupils: Pupils are equal, round, and reactive to light.   Cardiovascular:      Rate and Rhythm: Normal rate and regular rhythm.   Abdominal:      General: Bowel sounds are normal.      Tenderness: There is no guarding.   Musculoskeletal:      Right lower leg: Edema present.      Left lower leg: Edema present.   Skin:     General: Skin is warm and dry.   Neurological:      Cranial Nerves: No cranial nerve deficit.      Motor: No weakness.   NIH: 0  Psychiatric:         Mood and Affect: Mood normal.         Behavior: Behavior normal.     Outpatient Follow-Up  No future appointments.      Julien Gracia DO

## 2025-04-15 NOTE — PROGRESS NOTES
TCC Note: Pt is back from ECHO. SW and  did see pt yesterday. Plan remains for pt to return home with . Pt does have a written discharge today. PT AMPAC score is 18. Pt will need Halter Monitor placed prior to discharge today. Will continue to follow. Eun Becker, MSN,RN, TCC.

## 2025-04-16 LAB
ATRIAL RATE: 78 BPM
P AXIS: 38 DEGREES
P OFFSET: 194 MS
P ONSET: 125 MS
PR INTERVAL: 196 MS
Q ONSET: 223 MS
QRS COUNT: 13 BEATS
QRS DURATION: 94 MS
QT INTERVAL: 406 MS
QTC CALCULATION(BAZETT): 462 MS
QTC FREDERICIA: 443 MS
R AXIS: -7 DEGREES
T AXIS: 45 DEGREES
T OFFSET: 426 MS
VENTRICULAR RATE: 78 BPM

## 2025-04-29 ENCOUNTER — OFFICE VISIT (OUTPATIENT)
Dept: PODIATRY | Facility: CLINIC | Age: 70
End: 2025-04-29
Payer: COMMERCIAL

## 2025-04-29 DIAGNOSIS — I63.9 ACUTE CVA (CEREBROVASCULAR ACCIDENT) (MULTI): ICD-10-CM

## 2025-04-29 DIAGNOSIS — I87.8 CHRONIC VENOUS STASIS: ICD-10-CM

## 2025-04-29 DIAGNOSIS — R26.89 ANTALGIC GAIT: ICD-10-CM

## 2025-04-29 DIAGNOSIS — M79.672 FOOT PAIN, BILATERAL: Primary | ICD-10-CM

## 2025-04-29 DIAGNOSIS — E66.01 MORBID OBESITY (MULTI): ICD-10-CM

## 2025-04-29 DIAGNOSIS — M72.2 PLANTAR FASCIITIS OF LEFT FOOT: ICD-10-CM

## 2025-04-29 DIAGNOSIS — M79.671 FOOT PAIN, BILATERAL: Primary | ICD-10-CM

## 2025-04-29 DIAGNOSIS — B35.1 ONYCHOMYCOSIS: ICD-10-CM

## 2025-04-29 PROCEDURE — 99214 OFFICE O/P EST MOD 30 MIN: CPT | Performed by: PODIATRIST

## 2025-04-29 PROCEDURE — 1036F TOBACCO NON-USER: CPT | Performed by: PODIATRIST

## 2025-04-29 PROCEDURE — 1159F MED LIST DOCD IN RCRD: CPT | Performed by: PODIATRIST

## 2025-04-29 PROCEDURE — 1160F RVW MEDS BY RX/DR IN RCRD: CPT | Performed by: PODIATRIST

## 2025-04-29 RX ORDER — DICLOFENAC SODIUM 10 MG/G
GEL TOPICAL
COMMUNITY
Start: 2025-04-26

## 2025-04-29 NOTE — PROGRESS NOTES
Chief Complaint   Patient presents with    Heel Pain     NAIL CARE, LOOK AT NEW ORTHOTICS,ALSO HAD STROKE 3 WEEKS AGO     Chief Complaint   Patient presents with    Heel Pain     NAIL CARE, LOOK AT NEW ORTHOTICS,ALSO HAD STROKE 3 WEEKS AGO      History of CVA ~2 weeks ago.   No residuals.  She is not anticoagulated.  She did have an ablation procedure for A-fib long ago.  Bilateral foot pain b/c of nail pathology.  Difficulty with shoe gear difficulty ambulating because of this.  In addition Friday have pain left foot points to the dorsal aspect of the foot.  Since pain has significantly improved.  Had televisit was told it could be tendinitis.  At this time it is greatly improved.  Swelling has gone down.  She is gone orthotics and her previous fasciitis seems to be controlled.  Nondiabetic.      General/Constitutional: Alert. NAD.  Morbid obese.  Respiratory: Non labored breathing.   Psychiatric: Mood and affect normal/baseline.   HEENT: Sclera clear. Wearing corrective lenses.  Dermatologic: Nails are hypertrophic crumbly and yellow.  Nails are quite elongated.  Painful to palpation. No acute inflammatory infectious process.   Blistering with the legs.  Slight maceration webspaces.  Mild xerosis cutis.  Vascular: Pedal pulses are intact and symmetric including the dorsalis pedis and the posterior tibial pulses.  Chronic nonpitting edema of the extremities.  CVI.  Chronic lymphedema.  She is wear compression stockings.    Neurological: Alert and oriented. No acute distress. No sensory impairment bilateral.  No deficits noted.  Musculoskeletal: Strength is normal for age. No acute deficits appreciated.  Bilateral pes planus deformity.  Hallux valgus deformity bilaterally.  No palpable pain in the dorsum of the left foot.  No ecchymosis noted.  There is minimal swelling appreciated.  No evidence of gout.  No acute inflammation.  Range of motion causes no pain.  Using a cane for stability.    Impression: Chronic  venous insufficiency.  Chronic lymphedema.  Bilateral foot pain.  Painful nail pathology.  Onychomycosis.  Remote tendinitis.  Pes planus.-Today's treatment and course of therapy was discussed with the patient in detail. Patient's questions were answered. Proper foot care was discussed. This dictation was done using Dragon computer software and as such may contain grammatical errors.     -Nail debridement multiple nails 1 through 5 bilaterally.  Proper nail care discussed.  Keep clean and dry between the digits.    - Continue with the orthotics as they seem to be working well for you    - 4/15/2025 labs reviewed.  A1c 6.3    -Hospital discharge 4/15/2025 reviewed.  As per above.    -Follow-up 4 to 6 months.

## 2025-05-12 ENCOUNTER — APPOINTMENT (OUTPATIENT)
Dept: PODIATRY | Facility: CLINIC | Age: 70
End: 2025-05-12
Payer: COMMERCIAL

## 2025-05-16 LAB — BODY SURFACE AREA: 2.42 M2

## 2025-05-16 PROCEDURE — 93248 EXT ECG>7D<15D REV&INTERPJ: CPT | Performed by: INTERNAL MEDICINE

## 2025-05-20 ENCOUNTER — OFFICE VISIT (OUTPATIENT)
Dept: PODIATRY | Facility: CLINIC | Age: 70
End: 2025-05-20
Payer: COMMERCIAL

## 2025-05-20 ENCOUNTER — APPOINTMENT (OUTPATIENT)
Dept: CARDIOLOGY | Facility: HOSPITAL | Age: 70
End: 2025-05-20
Payer: COMMERCIAL

## 2025-05-20 ENCOUNTER — HOSPITAL ENCOUNTER (EMERGENCY)
Facility: HOSPITAL | Age: 70
Discharge: HOME | End: 2025-05-20
Payer: COMMERCIAL

## 2025-05-20 ENCOUNTER — APPOINTMENT (OUTPATIENT)
Dept: RADIOLOGY | Facility: HOSPITAL | Age: 70
End: 2025-05-20
Payer: COMMERCIAL

## 2025-05-20 VITALS
SYSTOLIC BLOOD PRESSURE: 147 MMHG | OXYGEN SATURATION: 92 % | TEMPERATURE: 97.7 F | HEART RATE: 86 BPM | DIASTOLIC BLOOD PRESSURE: 81 MMHG

## 2025-05-20 VITALS
DIASTOLIC BLOOD PRESSURE: 60 MMHG | WEIGHT: 272 LBS | RESPIRATION RATE: 19 BRPM | HEART RATE: 69 BPM | BODY MASS INDEX: 48.2 KG/M2 | HEIGHT: 63 IN | TEMPERATURE: 98.6 F | SYSTOLIC BLOOD PRESSURE: 128 MMHG | OXYGEN SATURATION: 95 %

## 2025-05-20 DIAGNOSIS — R06.02 MILD SHORTNESS OF BREATH: Primary | ICD-10-CM

## 2025-05-20 DIAGNOSIS — M79.672 FOOT PAIN, LEFT: Primary | ICD-10-CM

## 2025-05-20 DIAGNOSIS — J45.40 MODERATE PERSISTENT ASTHMA WITHOUT COMPLICATION (HHS-HCC): ICD-10-CM

## 2025-05-20 DIAGNOSIS — R42 DIZZINESS: ICD-10-CM

## 2025-05-20 DIAGNOSIS — R26.89 ANTALGIC GAIT: ICD-10-CM

## 2025-05-20 DIAGNOSIS — E66.01 MORBID OBESITY (MULTI): ICD-10-CM

## 2025-05-20 DIAGNOSIS — M19.072 PRIMARY OSTEOARTHRITIS OF LEFT FOOT: ICD-10-CM

## 2025-05-20 DIAGNOSIS — R45.89: ICD-10-CM

## 2025-05-20 LAB
ALBUMIN SERPL BCP-MCNC: 3.9 G/DL (ref 3.4–5)
ALP SERPL-CCNC: 92 U/L (ref 33–136)
ALT SERPL W P-5'-P-CCNC: 7 U/L (ref 7–45)
ANION GAP SERPL CALC-SCNC: 14 MMOL/L (ref 10–20)
AST SERPL W P-5'-P-CCNC: 11 U/L (ref 9–39)
BASOPHILS # BLD AUTO: 0.05 X10*3/UL (ref 0–0.1)
BASOPHILS NFR BLD AUTO: 0.6 %
BILIRUB SERPL-MCNC: 1 MG/DL (ref 0–1.2)
BNP SERPL-MCNC: 37 PG/ML (ref 0–99)
BUN SERPL-MCNC: 15 MG/DL (ref 6–23)
CALCIUM SERPL-MCNC: 8.9 MG/DL (ref 8.6–10.3)
CARDIAC TROPONIN I PNL SERPL HS: 6 NG/L (ref 0–13)
CARDIAC TROPONIN I PNL SERPL HS: 7 NG/L (ref 0–13)
CHLORIDE SERPL-SCNC: 105 MMOL/L (ref 98–107)
CO2 SERPL-SCNC: 27 MMOL/L (ref 21–32)
CREAT SERPL-MCNC: 1 MG/DL (ref 0.5–1.05)
EGFRCR SERPLBLD CKD-EPI 2021: 61 ML/MIN/1.73M*2
EOSINOPHIL # BLD AUTO: 0.22 X10*3/UL (ref 0–0.7)
EOSINOPHIL NFR BLD AUTO: 2.4 %
ERYTHROCYTE [DISTWIDTH] IN BLOOD BY AUTOMATED COUNT: 14.6 % (ref 11.5–14.5)
GLUCOSE SERPL-MCNC: 123 MG/DL (ref 74–99)
HCT VFR BLD AUTO: 39.8 % (ref 36–46)
HGB BLD-MCNC: 12.6 G/DL (ref 12–16)
IMM GRANULOCYTES # BLD AUTO: 0.05 X10*3/UL (ref 0–0.7)
IMM GRANULOCYTES NFR BLD AUTO: 0.6 % (ref 0–0.9)
LYMPHOCYTES # BLD AUTO: 1.21 X10*3/UL (ref 1.2–4.8)
LYMPHOCYTES NFR BLD AUTO: 13.4 %
MAGNESIUM SERPL-MCNC: 1.9 MG/DL (ref 1.6–2.4)
MCH RBC QN AUTO: 27.7 PG (ref 26–34)
MCHC RBC AUTO-ENTMCNC: 31.7 G/DL (ref 32–36)
MCV RBC AUTO: 88 FL (ref 80–100)
MONOCYTES # BLD AUTO: 0.63 X10*3/UL (ref 0.1–1)
MONOCYTES NFR BLD AUTO: 7 %
NEUTROPHILS # BLD AUTO: 6.84 X10*3/UL (ref 1.2–7.7)
NEUTROPHILS NFR BLD AUTO: 76 %
NRBC BLD-RTO: 0 /100 WBCS (ref 0–0)
PLATELET # BLD AUTO: 222 X10*3/UL (ref 150–450)
POTASSIUM SERPL-SCNC: 3.5 MMOL/L (ref 3.5–5.3)
PROT SERPL-MCNC: 7.1 G/DL (ref 6.4–8.2)
RBC # BLD AUTO: 4.55 X10*6/UL (ref 4–5.2)
SODIUM SERPL-SCNC: 142 MMOL/L (ref 136–145)
WBC # BLD AUTO: 9 X10*3/UL (ref 4.4–11.3)

## 2025-05-20 PROCEDURE — 84484 ASSAY OF TROPONIN QUANT: CPT | Performed by: PHYSICIAN ASSISTANT

## 2025-05-20 PROCEDURE — 1159F MED LIST DOCD IN RCRD: CPT | Performed by: PODIATRIST

## 2025-05-20 PROCEDURE — 99214 OFFICE O/P EST MOD 30 MIN: CPT | Performed by: PODIATRIST

## 2025-05-20 PROCEDURE — 20605 DRAIN/INJ JOINT/BURSA W/O US: CPT | Mod: LT | Performed by: PODIATRIST

## 2025-05-20 PROCEDURE — 80053 COMPREHEN METABOLIC PANEL: CPT | Performed by: PHYSICIAN ASSISTANT

## 2025-05-20 PROCEDURE — 71046 X-RAY EXAM CHEST 2 VIEWS: CPT | Mod: FOREIGN READ | Performed by: RADIOLOGY

## 2025-05-20 PROCEDURE — 1160F RVW MEDS BY RX/DR IN RCRD: CPT | Performed by: PODIATRIST

## 2025-05-20 PROCEDURE — 83735 ASSAY OF MAGNESIUM: CPT | Performed by: PHYSICIAN ASSISTANT

## 2025-05-20 PROCEDURE — 20605 DRAIN/INJ JOINT/BURSA W/O US: CPT | Performed by: PODIATRIST

## 2025-05-20 PROCEDURE — 83880 ASSAY OF NATRIURETIC PEPTIDE: CPT | Performed by: PHYSICIAN ASSISTANT

## 2025-05-20 PROCEDURE — 36415 COLL VENOUS BLD VENIPUNCTURE: CPT | Performed by: PHYSICIAN ASSISTANT

## 2025-05-20 PROCEDURE — 85025 COMPLETE CBC W/AUTO DIFF WBC: CPT | Performed by: PHYSICIAN ASSISTANT

## 2025-05-20 PROCEDURE — 71046 X-RAY EXAM CHEST 2 VIEWS: CPT

## 2025-05-20 PROCEDURE — 2500000004 HC RX 250 GENERAL PHARMACY W/ HCPCS (ALT 636 FOR OP/ED): Mod: JZ | Performed by: PODIATRIST

## 2025-05-20 PROCEDURE — 1036F TOBACCO NON-USER: CPT | Performed by: PODIATRIST

## 2025-05-20 PROCEDURE — 99285 EMERGENCY DEPT VISIT HI MDM: CPT | Mod: 25

## 2025-05-20 PROCEDURE — 93005 ELECTROCARDIOGRAM TRACING: CPT

## 2025-05-20 PROCEDURE — 99214 OFFICE O/P EST MOD 30 MIN: CPT | Mod: 25 | Performed by: PODIATRIST

## 2025-05-20 RX ORDER — TRIAMCINOLONE ACETONIDE 40 MG/ML
40 INJECTION, SUSPENSION INTRA-ARTICULAR; INTRAMUSCULAR ONCE
Status: COMPLETED | OUTPATIENT
Start: 2025-05-20 | End: 2025-05-20

## 2025-05-20 RX ADMIN — TRIAMCINOLONE ACETONIDE 40 MG: 40 INJECTION, SUSPENSION INTRA-ARTICULAR; INTRAMUSCULAR at 09:49

## 2025-05-20 ASSESSMENT — LIFESTYLE VARIABLES
HAVE PEOPLE ANNOYED YOU BY CRITICIZING YOUR DRINKING: NO
EVER FELT BAD OR GUILTY ABOUT YOUR DRINKING: NO
HAVE YOU EVER FELT YOU SHOULD CUT DOWN ON YOUR DRINKING: NO
TOTAL SCORE: 0
EVER HAD A DRINK FIRST THING IN THE MORNING TO STEADY YOUR NERVES TO GET RID OF A HANGOVER: NO

## 2025-05-20 ASSESSMENT — COLUMBIA-SUICIDE SEVERITY RATING SCALE - C-SSRS
2. HAVE YOU ACTUALLY HAD ANY THOUGHTS OF KILLING YOURSELF?: NO
6. HAVE YOU EVER DONE ANYTHING, STARTED TO DO ANYTHING, OR PREPARED TO DO ANYTHING TO END YOUR LIFE?: NO
1. IN THE PAST MONTH, HAVE YOU WISHED YOU WERE DEAD OR WISHED YOU COULD GO TO SLEEP AND NOT WAKE UP?: NO

## 2025-05-20 ASSESSMENT — PAIN - FUNCTIONAL ASSESSMENT: PAIN_FUNCTIONAL_ASSESSMENT: 0-10

## 2025-05-20 ASSESSMENT — PAIN SCALES - GENERAL
PAINLEVEL_OUTOF10: 0 - NO PAIN
PAINLEVEL_OUTOF10: 0 - NO PAIN

## 2025-05-20 NOTE — ED TRIAGE NOTES
PT PRESENTS TO ED VIA EMS FROM [facility] OFFICE FOR SHORTNESS OF BREATH. PT STATES WHEN SHE GOT TO HER [facility] OFFICE SHE BEGAN TO FEEL ANXIOUS. PT DID TRY TO TAKE HER RESCUE INHALER SEVERAL TIMES WITH NO RELIEF.

## 2025-05-20 NOTE — PROGRESS NOTES
Chief Complaint   Patient presents with    Foot Pain     L FOOT PAIN      Chief Complaint   Patient presents with    Foot Pain     L FOOT PAIN      Chief Complaint   Patient presents with    Foot Pain     L FOOT PAIN    Acute exacerbation of the pain which is recurrent pain dorsum left foot points to the mid central tarsal area thinks it may be shoe related.  No trauma.  She does have a history of DJD.  History of CVA ~2 weeks ago.   Nondiabetic.  Additionally as patient was walking back to the room she was feeling unsteady.  She was not feeling well.  Still stressing that she wanted to be seen.  She was ambulating on her own with some assistance from my staff.  Tells me that she had a rough morning.  Her  has psychiatric issues and part of this anxiety that she is experiencing now may be related to that.  She used her inhaler.  This did not seem to improve anything.    General/Constitutional: During her visit patient was shaky.  Appears anxious/apprehensive.  Some difficulty getting words out.  Mild pallor.  She is otherwise alert.  Mild distress.  Respiratory: Breathing was unlabored.  Psychiatric: Anxious today.  Dermatologic: Skin and nails intact per baseline dystrophy with the nails.  No acute inflammatory infectious process.   Blistering with the legs.  Slight maceration webspaces.  Mild xerosis cutis.  Vascular: Pedal pulses are intact and symmetric including the dorsalis pedis and the posterior tibial pulses.  Chronic nonpitting edema of the extremities.  CVI.  Chronic lymphedema.  She is wear compression stockings.    Neurological: Alert and oriented. No acute distress. No sensory impairment bilateral.  No deficits noted.  Musculoskeletal: Pain on palpation at the strength is normal for age. No acute deficits appreciated.  Bilateral pes planus deformity.  Hallux valgus deformity bilaterally.  Pain on palpation dorsum of the left foot.  Mild swelling is noted.  No bruising.  There is exostosis  palpable.  Frontal plane range of motion causes pain.      Impression: Recurrent onset pain left foot.  DJD.  Acute distress.      -Today's treatment and course of therapy was discussed with the patient in detail. Patient's questions were answered. Proper foot care was discussed. This dictation was done using Dragon computer software and as such may contain grammatical errors.     - Kenalog injection dorsum of the left foot.  This was well-tolerated.  I did discuss proper shoe gear to wear.  Stop wearing the orthotic for the time being so I do with this.    - Will further evaluate this in 2 to 3 weeks.  Will see how you do with the injection.    -Vitals obtained.  97.5.  147/81.  92%.  Pulse 86.    - EMS was called.  She was agreeable to this.  She was taken to the emergency department.

## 2025-05-20 NOTE — DISCHARGE INSTRUCTIONS
Please follow-up with your primary care doctor.  Please return if any worsening symptoms or increased shortness of breath.  Otherwise use your inhaler for your shortness of breath as needed

## 2025-05-20 NOTE — ED PROVIDER NOTES
HPI   Chief Complaint   Patient presents with    Shortness of Breath       HPI This is a 70-year-old female who was at the podiatry office getting an injection and then she felt somewhat short of breath.  She states she does get a bit anxious.  She does have a history of anxiety.  She states she also has asthma.  She felt a little wheezy.  She took a few inhalers and the podiatrist urged her to come here therefore she presents.  At no time did she have any chest pain nausea vomiting diaphoresis.  She said she feels better after the inhalers already.  No cough or cold symptoms no numbness tingling or weakness no headache or visual        Patient History   Medical History[1]  Surgical History[2]  Family History[3]  Social History[4]    Physical Exam   ED Triage Vitals [05/20/25 0952]   Temperature Heart Rate Respirations BP   37 °C (98.6 °F) 86 16 139/82      Pulse Ox Temp Source Heart Rate Source Patient Position   99 % Temporal Monitor Sitting      BP Location FiO2 (%)     Right arm --       Physical Exam      Reviewed family history social history and allergies and were noncontributory to current problem.    Review of systems as noted in history of present illness  otherwise negative. All other systems were reviewed and negative.     PMHX: Chronic conditions: reviewd in EMR, relevant history noted in HPI                Surgeries, hospitalizations: reviewed in EMR , relevant history noted in HPI                Medications: reviewed in EMR, relevant history noted in HPI                Allergies: reviewed in EMR, relevant history noted in HPI      PHYSICAL EXAM:    GENERAL/ CONSTITUTIONAL: Vitals noted, no distress. Alert and oriented  x 3. Non-toxic.  No Drooling or stridor .    HEAD: Normocephalic Atraumatic    EENT:  Posterior oropharynx unremarkable. No meningismus. No LAD.  No exudate present.      EYES: PERRLA EOMI     NECK: Supple. Nontender. No midline tenderness.  Full range of motion    CARDIAC: Regular,  rate, rhythm. No murmurs rubs or gallops. No JVD    PULMONARY: Lungs clear bilaterally with good aeration. No wheezes rales or rhonchi. No respiratory distress.     GI: Soft, . Nontender. No peritoneal signs. Normoactive bowel sounds. No pulsatile masses.  No guarding or rebound    EXTREMITIES/MUSCULOSKELTAL: No peripheral edema. NVIT,  pulses +2 /4 equal. FROM in all extremities and equal.     SKIN: No rash. Intact.     NEURO: No focal neurologic deficits,     PSYCH: appropriate mood and affect    MEDICAL DECISION MAKING:    ED Course & MDM   Labs ordered all unremarkable including delta Trope.  Twelve-lead EKG showed normal sinus rhythm rate of 66 normal axis QT NV interval.  No STEMI interpreted by Dr. Jacob Haas.    ED Course as of 25 1318   Tue May 20, 2025   1251 Mild vascular congestion without overt edema [CV]      ED Course User Index  [CV] Candice Jung PA-C         Diagnoses as of 25 1318   Mild shortness of breath   Patient is BNP is within normal limits.  Patient be home-going primary care.              No data recorded     [place] Coma Scale Score: 15 (25 0957 : Shante Garsia RN)                           Medical Decision Making  Homegoing to follow up with primary care doctor follow-up with your cardiologist as well.  If any worsening return    Procedure  Procedures       [1]   Past Medical History:  Diagnosis Date    Personal history of other diseases of the respiratory system 2015    History of asthma    Unilateral primary osteoarthritis, left knee 2015    Degenerative arthritis of left knee   [2]   Past Surgical History:  Procedure Laterality Date    AV NODE ABLATION  2016    Catheter Ablation Atrial Fibrillation     SECTION, CLASSIC  2015     Section    HERNIA REPAIR  2015    Hernia Repair Inguinal Bilateral    OTHER SURGICAL HISTORY  2015    Hysterotomy (Obstetrical)    OTHER SURGICAL HISTORY  2015    Foot Surgery  Left   [3] No family history on file.  [4]   Social History  Tobacco Use    Smoking status: Never    Smokeless tobacco: Never   Substance Use Topics    Alcohol use: Never    Drug use: Never        Candice Jung PA-C  05/20/25 3215

## 2025-09-09 ENCOUNTER — APPOINTMENT (OUTPATIENT)
Dept: NEUROLOGY | Facility: CLINIC | Age: 70
End: 2025-09-09
Payer: COMMERCIAL